# Patient Record
Sex: FEMALE | Race: WHITE | NOT HISPANIC OR LATINO | Employment: UNEMPLOYED | URBAN - METROPOLITAN AREA
[De-identification: names, ages, dates, MRNs, and addresses within clinical notes are randomized per-mention and may not be internally consistent; named-entity substitution may affect disease eponyms.]

---

## 2023-10-02 DIAGNOSIS — E78.5 HYPERLIPIDEMIA, UNSPECIFIED: ICD-10-CM

## 2023-10-02 RX ORDER — ROSUVASTATIN CALCIUM 20 MG/1
TABLET, COATED ORAL
Qty: 90 TABLET | Refills: 0 | Status: SHIPPED | OUTPATIENT
Start: 2023-10-02 | End: 2024-04-22 | Stop reason: SDUPTHER

## 2023-10-02 RX ORDER — ROSUVASTATIN CALCIUM 20 MG/1
20 TABLET, COATED ORAL EVERY OTHER DAY
Qty: 15 TABLET | Refills: 0 | Status: SHIPPED | OUTPATIENT
Start: 2023-10-02 | End: 2023-10-02 | Stop reason: SDUPTHER

## 2023-10-20 ENCOUNTER — TELEMEDICINE (OUTPATIENT)
Dept: PRIMARY CARE | Facility: CLINIC | Age: 58
End: 2023-10-20
Payer: COMMERCIAL

## 2023-10-20 DIAGNOSIS — J32.1 FRONTAL SINUSITIS, UNSPECIFIED CHRONICITY: Primary | ICD-10-CM

## 2023-10-20 DIAGNOSIS — R11.0 NAUSEA: ICD-10-CM

## 2023-10-20 PROBLEM — R76.8 ANA POSITIVE: Status: ACTIVE | Noted: 2023-10-20

## 2023-10-20 PROBLEM — M32.9 LUPUS (SYSTEMIC LUPUS ERYTHEMATOSUS) (MULTI): Status: ACTIVE | Noted: 2023-10-20

## 2023-10-20 PROBLEM — Z79.899 IMMUNOSUPPRESSION DUE TO DRUG THERAPY (MULTI): Status: ACTIVE | Noted: 2023-10-20

## 2023-10-20 PROBLEM — K21.9 CHRONIC GERD: Status: ACTIVE | Noted: 2023-10-20

## 2023-10-20 PROBLEM — E55.9 VITAMIN D DEFICIENCY: Status: ACTIVE | Noted: 2023-10-20

## 2023-10-20 PROBLEM — M33.13 DERMATOMYOSITIS (MULTI): Status: ACTIVE | Noted: 2021-01-08

## 2023-10-20 PROBLEM — I10 BENIGN ESSENTIAL HYPERTENSION: Status: ACTIVE | Noted: 2023-10-20

## 2023-10-20 PROBLEM — M47.812 CERVICAL ARTHRITIS: Status: ACTIVE | Noted: 2023-10-20

## 2023-10-20 PROBLEM — J01.90 ACUTE SINUS INFECTION: Status: ACTIVE | Noted: 2023-10-20

## 2023-10-20 PROBLEM — G50.9 TRIGEMINAL NEUROPATHY: Status: ACTIVE | Noted: 2023-10-20

## 2023-10-20 PROBLEM — R82.90 ABNORMAL URINE ODOR: Status: ACTIVE | Noted: 2023-10-20

## 2023-10-20 PROBLEM — N39.0 UTI (URINARY TRACT INFECTION): Status: ACTIVE | Noted: 2023-10-20

## 2023-10-20 PROBLEM — E03.9 HYPOTHYROIDISM: Status: ACTIVE | Noted: 2020-11-16

## 2023-10-20 PROBLEM — M85.80 OSTEOPENIA: Status: ACTIVE | Noted: 2023-10-20

## 2023-10-20 PROBLEM — D84.821 IMMUNOSUPPRESSION DUE TO DRUG THERAPY (MULTI): Status: ACTIVE | Noted: 2023-10-20

## 2023-10-20 PROBLEM — K21.9 GASTROESOPHAGEAL REFLUX DISEASE: Status: ACTIVE | Noted: 2020-11-16

## 2023-10-20 PROBLEM — R93.1 AGATSTON CORONARY ARTERY CALCIUM SCORE BETWEEN 200 AND 399: Status: ACTIVE | Noted: 2023-10-20

## 2023-10-20 PROBLEM — K31.9 GASTROPATHY: Status: ACTIVE | Noted: 2023-10-20

## 2023-10-20 PROBLEM — R73.9 HYPERGLYCEMIA: Status: ACTIVE | Noted: 2023-10-20

## 2023-10-20 PROBLEM — R07.89 CHEST PRESSURE: Status: ACTIVE | Noted: 2023-10-20

## 2023-10-20 PROBLEM — R05.9 COUGH: Status: ACTIVE | Noted: 2023-10-20

## 2023-10-20 PROBLEM — R20.0 BILATERAL HAND NUMBNESS: Status: ACTIVE | Noted: 2023-10-20

## 2023-10-20 PROBLEM — R53.83 FATIGUE: Status: ACTIVE | Noted: 2023-10-20

## 2023-10-20 PROCEDURE — 99213 OFFICE O/P EST LOW 20 MIN: CPT | Performed by: INTERNAL MEDICINE

## 2023-10-20 RX ORDER — MULTIVIT-MIN/IRON/FOLIC ACID/K 18-600-40
1 CAPSULE ORAL DAILY
COMMUNITY

## 2023-10-20 RX ORDER — ATENOLOL 25 MG/1
25 TABLET ORAL DAILY
COMMUNITY
End: 2023-11-21

## 2023-10-20 RX ORDER — AZATHIOPRINE 50 MG/1
150 TABLET ORAL
COMMUNITY
Start: 2023-01-31

## 2023-10-20 RX ORDER — ONDANSETRON 4 MG/1
4 TABLET, FILM COATED ORAL EVERY 8 HOURS PRN
Qty: 21 TABLET | Refills: 1 | Status: SHIPPED | OUTPATIENT
Start: 2023-10-20

## 2023-10-20 RX ORDER — ACYCLOVIR 400 MG/1
400 TABLET ORAL 3 TIMES DAILY
COMMUNITY
Start: 2023-09-15 | End: 2023-10-20 | Stop reason: WASHOUT

## 2023-10-20 RX ORDER — HYDROXYCHLOROQUINE SULFATE 200 MG/1
400 TABLET, FILM COATED ORAL DAILY
COMMUNITY
Start: 2022-02-10

## 2023-10-20 RX ORDER — AMPICILLIN 500 MG/1
500 CAPSULE ORAL 3 TIMES DAILY
Qty: 21 CAPSULE | Refills: 0 | Status: SHIPPED | OUTPATIENT
Start: 2023-10-20 | End: 2023-10-27

## 2023-10-20 RX ORDER — FLUTICASONE PROPIONATE 50 MCG
1 SPRAY, SUSPENSION (ML) NASAL 2 TIMES DAILY
COMMUNITY

## 2023-10-20 RX ORDER — OMEPRAZOLE 40 MG/1
40 CAPSULE, DELAYED RELEASE ORAL DAILY
COMMUNITY
End: 2024-03-12

## 2023-10-20 RX ORDER — ONDANSETRON 4 MG/1
4 TABLET, FILM COATED ORAL ONCE
Status: DISCONTINUED | OUTPATIENT
Start: 2023-10-20 | End: 2024-01-04

## 2023-10-20 RX ORDER — PREDNISONE 20 MG/1
20 TABLET ORAL 2 TIMES DAILY
COMMUNITY
End: 2023-10-20 | Stop reason: WASHOUT

## 2023-10-20 RX ORDER — IBUPROFEN 600 MG/1
600 TABLET ORAL 3 TIMES DAILY
COMMUNITY

## 2023-10-20 RX ORDER — CHOLECALCIFEROL (VITAMIN D3) 25 MCG
5000 TABLET ORAL DAILY
COMMUNITY

## 2023-10-20 RX ORDER — AMOXICILLIN 875 MG/1
875 TABLET, FILM COATED ORAL EVERY 12 HOURS
COMMUNITY
Start: 2023-09-11 | End: 2023-10-20 | Stop reason: WASHOUT

## 2023-10-20 RX ORDER — LOSARTAN POTASSIUM 25 MG/1
25 TABLET ORAL DAILY
COMMUNITY
End: 2023-11-09

## 2023-10-20 RX ORDER — LEVOTHYROXINE SODIUM 75 UG/1
1 TABLET ORAL DAILY
COMMUNITY
Start: 2018-05-21 | End: 2024-03-18 | Stop reason: SDUPTHER

## 2023-10-20 RX ORDER — PNV NO.95/FERROUS FUM/FOLIC AC 28MG-0.8MG
100 TABLET ORAL DAILY
COMMUNITY

## 2023-10-20 ASSESSMENT — PATIENT HEALTH QUESTIONNAIRE - PHQ9
SUM OF ALL RESPONSES TO PHQ9 QUESTIONS 1 AND 2: 0
2. FEELING DOWN, DEPRESSED OR HOPELESS: NOT AT ALL
1. LITTLE INTEREST OR PLEASURE IN DOING THINGS: NOT AT ALL

## 2023-10-20 ASSESSMENT — ENCOUNTER SYMPTOMS
OCCASIONAL FEELINGS OF UNSTEADINESS: 0
DEPRESSION: 0
LOSS OF SENSATION IN FEET: 0

## 2023-10-20 NOTE — PROGRESS NOTES
Subjective   Patient ID: Lyla Hanson is a 58 y.o. female who presents for flu like symptoms (Patient advised that she has been sick since Sunday. Chest congestion, sinus pressure, sinus congestion. ) and Nausea (Patient would like  an rx for Zofran ).    HPI   58-year-old female with a past medical history of multiple medical problem of Dr. Reed complaining of nausea for last couple of days denies any fever but she feels like her sinus congestion and whenever she stands she feels nauseated probably look like dizziness no chest pain also no sore throat  Review of Systems    Objective   There were no vitals taken for this visit.    Physical Exam    Assessment/Plan        Sinusitis and nausea I put her on ampicillin and gave her Zofran to take as needed advised to give us a call if not getting better or come back and see us in person

## 2023-11-06 DIAGNOSIS — I10 ESSENTIAL (PRIMARY) HYPERTENSION: ICD-10-CM

## 2023-11-07 ENCOUNTER — CLINICAL SUPPORT (OUTPATIENT)
Dept: PRIMARY CARE | Facility: CLINIC | Age: 58
End: 2023-11-07
Payer: COMMERCIAL

## 2023-11-07 DIAGNOSIS — Z23 NEED FOR VACCINATION: ICD-10-CM

## 2023-11-07 PROCEDURE — 90471 IMMUNIZATION ADMIN: CPT | Performed by: FAMILY MEDICINE

## 2023-11-07 PROCEDURE — 90686 IIV4 VACC NO PRSV 0.5 ML IM: CPT | Performed by: FAMILY MEDICINE

## 2023-11-08 NOTE — PROGRESS NOTES
Patient ID: Lyla Hanson is a 58 y.o. female.    ProceduresSubjective   Patient ID: Lyla Hanson is a 58 y.o. female who presents for No chief complaint on file..    HPI     Review of Systems    Objective   There were no vitals taken for this visit.    Physical Exam    Assessment/Plan

## 2023-11-09 RX ORDER — LOSARTAN POTASSIUM 25 MG/1
25 TABLET ORAL DAILY
Qty: 90 TABLET | Refills: 3 | Status: SHIPPED | OUTPATIENT
Start: 2023-11-09 | End: 2023-12-19 | Stop reason: ALTCHOICE

## 2023-11-18 DIAGNOSIS — I10 BENIGN ESSENTIAL HYPERTENSION: Primary | ICD-10-CM

## 2023-11-21 RX ORDER — ATENOLOL 25 MG/1
25 TABLET ORAL DAILY
Qty: 30 TABLET | Refills: 2 | Status: SHIPPED | OUTPATIENT
Start: 2023-11-21 | End: 2024-04-19

## 2023-12-19 ENCOUNTER — OFFICE VISIT (OUTPATIENT)
Dept: CARDIOLOGY | Facility: CLINIC | Age: 58
End: 2023-12-19
Payer: COMMERCIAL

## 2023-12-19 VITALS
WEIGHT: 199 LBS | HEART RATE: 83 BPM | BODY MASS INDEX: 36.62 KG/M2 | HEIGHT: 62 IN | DIASTOLIC BLOOD PRESSURE: 82 MMHG | SYSTOLIC BLOOD PRESSURE: 138 MMHG | TEMPERATURE: 97.5 F

## 2023-12-19 DIAGNOSIS — I10 BENIGN ESSENTIAL HYPERTENSION: Primary | ICD-10-CM

## 2023-12-19 DIAGNOSIS — E78.2 MIXED HYPERLIPIDEMIA: ICD-10-CM

## 2023-12-19 DIAGNOSIS — Z78.9 NEVER SMOKED ANY SUBSTANCE: ICD-10-CM

## 2023-12-19 DIAGNOSIS — R07.89 ATYPICAL CHEST PAIN: ICD-10-CM

## 2023-12-19 DIAGNOSIS — R93.1 AGATSTON CORONARY ARTERY CALCIUM SCORE BETWEEN 200 AND 399: ICD-10-CM

## 2023-12-19 PROCEDURE — 3079F DIAST BP 80-89 MM HG: CPT | Performed by: INTERNAL MEDICINE

## 2023-12-19 PROCEDURE — 99213 OFFICE O/P EST LOW 20 MIN: CPT | Performed by: INTERNAL MEDICINE

## 2023-12-19 PROCEDURE — 3075F SYST BP GE 130 - 139MM HG: CPT | Performed by: INTERNAL MEDICINE

## 2023-12-19 PROCEDURE — 1036F TOBACCO NON-USER: CPT | Performed by: INTERNAL MEDICINE

## 2023-12-19 RX ORDER — REGADENOSON 0.08 MG/ML
0.4 INJECTION, SOLUTION INTRAVENOUS
Status: CANCELLED | OUTPATIENT
Start: 2023-12-19

## 2023-12-19 RX ORDER — OLMESARTAN MEDOXOMIL 20 MG/1
20 TABLET ORAL DAILY
Qty: 90 TABLET | Refills: 3 | Status: SHIPPED | OUTPATIENT
Start: 2023-12-19 | End: 2024-12-18

## 2023-12-19 ASSESSMENT — PATIENT HEALTH QUESTIONNAIRE - PHQ9
2. FEELING DOWN, DEPRESSED OR HOPELESS: NOT AT ALL
1. LITTLE INTEREST OR PLEASURE IN DOING THINGS: NOT AT ALL
SUM OF ALL RESPONSES TO PHQ9 QUESTIONS 1 AND 2: 0

## 2023-12-19 ASSESSMENT — ENCOUNTER SYMPTOMS
PALPITATIONS: 1
ABDOMINAL DISTENTION: 1
PSYCHIATRIC NEGATIVE: 1
ABDOMINAL PAIN: 1
HEMATURIA: 0
RESPIRATORY NEGATIVE: 1
LIGHT-HEADEDNESS: 1
ENDOCRINE NEGATIVE: 1
FREQUENCY: 1
EYES NEGATIVE: 1
ARTHRALGIAS: 1
CONSTITUTIONAL NEGATIVE: 1

## 2023-12-19 NOTE — PROGRESS NOTES
Patient:  Lyla Hanson  YOB: 1965  MRN: [id number]       Chief Complaint/Active Symptoms:       Lyla Hanson is a 58 y.o. female who returns today for cardiac follow-up.    Here for follow-up of HTN. Has had some labile blood pressure readings. Had a lot of problems when she was out of town and had shingles. Her immuran was on hold and her BP dropped. Has periods where her HR is both high and low. Only episode of near syncope was when she was sick and purportedly dehydrated her HR was 140 and she felt near syncopal. Resolved and has not recurred.      Has had no consistent chest pain. Had an episode of transient squeezing central chest pain lasting 5-10 seconds without other associated symptoms. No recurrence.     No shortness of breath, orthopnea or PND.       Review of Systems   Constitutional: Negative.    HENT: Negative.     Eyes: Negative.    Respiratory: Negative.     Cardiovascular:  Positive for chest pain and palpitations.   Gastrointestinal:  Positive for abdominal distention and abdominal pain (GERD).   Endocrine: Negative.    Genitourinary:  Positive for frequency (foaming urine - resolved). Negative for hematuria.   Musculoskeletal:  Positive for arthralgias.   Neurological:  Positive for light-headedness. Negative for syncope.   Psychiatric/Behavioral: Negative.     All other systems reviewed and are negative.      Objective:     Vitals:    12/19/23 0918   BP: 138/82   Pulse: 83   Temp: 36.4 °C (97.5 °F)       Vitals:    12/19/23 0918   Weight: 90.3 kg (199 lb)       Allergies:     Allergies   Allergen Reactions    Bactrim [Sulfamethoxazole-Trimethoprim] Hives    Pseudoephedrine Hcl Unknown    Ciprofloxacin Palpitations and Other     Other reaction(s): Irregular Heartbeat          Medications:     Current Outpatient Medications   Medication Instructions    ascorbic acid, vitamin C, 500 mg capsule 1 capsule, oral, Daily    atenolol (TENORMIN) 25 mg, oral, Daily    azaTHIOprine  (IMURAN) 150 mg, oral, Daily RT    cholecalciferol (VITAMIN D-3) 1,000 Units, oral, Daily    cyanocobalamin (VITAMIN B-12) 100 mcg, oral, Daily    fluticasone (Flonase) 50 mcg/actuation nasal spray 1 spray, nasal, 2 times daily    hydroxychloroquine (PLAQUENIL) 400 mg, oral, Daily    ibuprofen 600 mg, oral, 3 times daily    levothyroxine (Synthroid, Levoxyl) 75 mcg tablet 1 tablet, oral, Daily    olmesartan (BENICAR) 20 mg, oral, Daily    omeprazole (PRILOSEC) 40 mg, oral, Daily    ondansetron (ZOFRAN) 4 mg, oral, Every 8 hours PRN    rosuvastatin (Crestor) 20 mg tablet AT BEDTIME       Physical Examination:   GENERAL:  Well developed, well nourished, in no acute distress.  HEENT: NC AT, EOMI with anicteric sclera  NECK:  Supple, no JVD, no bruit.  CHEST:  Symmetric and nontender.  LUNGS:  Clear to auscultation bilaterally, normal respiratory effort.  HEART:  PMI is nondisplaced. RRR with normal S1 and S2, no S3, no mumur or rub. No carotid or abdominal bruits  ABDOMEN: Soft, NT, ND without palpable organomegaly or bruits  EXTREMITIES:  Warm with good color, no clubbing or cyanosis.  There is no edema noted.  PERIPHERAL VASCULAR:  Pulses present and equally palpable; 2+ throughout.  MUSCULOSKELETAL: Mild arthritic changes  NEURO/PSYCH:  Alert and oriented times three with approppriate behavior and responses. Nonfocal motor examination with normal gait and ambulation  Lymph: No significant palpable lymphadenopathy  Skin: no rash or lesions on exposed skin or reported.    Lab:     CBC:   Lab Results   Component Value Date    WBC 4.3 (L) 01/12/2023    RBC 4.65 01/12/2023    HGB 13.1 01/12/2023    HCT 41.3 01/12/2023     01/12/2023        CMP:    Lab Results   Component Value Date     01/12/2023    K 4.3 01/12/2023     01/12/2023    CO2 31 01/12/2023    BUN 9 01/12/2023    CREATININE 0.87 01/12/2023    GLUCOSE 86 01/12/2023    CALCIUM 9.4 01/12/2023       Magnesium:    No results found for:  "\"MG\"    Lipid Profile:    Lab Results   Component Value Date    TRIG 56 01/12/2023    HDL 52.0 01/12/2023       TSH:    Lab Results   Component Value Date    TSH 1.82 01/12/2023       BNP:   No results found for: \"BNP\"     PT/INR:    Lab Results   Component Value Date    PROTIME 12.5 04/08/2022    INR 1.1 04/08/2022       HgBA1c:    Lab Results   Component Value Date    HGBA1C 5.8 (A) 02/26/2022       BMP:  Lab Results   Component Value Date     01/12/2023     04/08/2022     12/08/2021    K 4.3 01/12/2023    K 4.7 08/16/2022    K 5.4 (H) 08/08/2022     01/12/2023     04/08/2022     12/08/2021    CO2 31 01/12/2023    CO2 26 04/08/2022    CO2 32 12/08/2021    BUN 9 01/12/2023    BUN 8 04/08/2022    BUN 9 12/08/2021    CREATININE 0.87 01/12/2023    CREATININE 0.77 04/08/2022    CREATININE 0.86 12/08/2021       Cardiac Enzymes:    No results found for: \"TROPHS\"    Hepatic Function Panel:    Lab Results   Component Value Date    ALKPHOS 79 01/12/2023    ALT 16 01/12/2023    AST 27 01/12/2023    PROT 6.9 01/12/2023    BILITOT 0.6 01/12/2023         Diagnostic Studies:     No echocardiogram results found for the past 12 months    No nuclear medicine results found for the past 12 months    No valid procedures specified.    EKG:   No results found for: \"EKG\"    Radiology:     Nuclear Stress Test    (Results Pending)         ASSESSMENT     Problem List Items Addressed This Visit       Agatston coronary artery calcium score between 200 and 399    Relevant Orders    Nuclear Stress Test    Benign essential hypertension - Primary    Relevant Medications    olmesartan (Benicar) 20 mg tablet    Hyperlipidemia    Never smoked any substance    Atypical chest pain    Relevant Orders    Nuclear Stress Test       PLAN     1.  Atypical chest pain.  The patient has some ongoing intermittent episodes of different types of chest discomfort.  As we discussed at her last appointment we will do a risk " stratification with a stress test.  If that is normal or low risk then we will continue to follow her clinically with risk factor modification.  2.  Benign essential hypertension with reactive hypertension.  Patient has some labile blood pressure readings.  Will change her to to olmesartan which is a better 24-hour blood pressure medication.  Monitor blood pressure readings.  Some of her changes are could be due to changing doses of her Imuran.  We have warned her that if her Imuran is held for any reason she should watch and should probably cut her dose of blood pressure medications in half.  3.  Hyperlipidemia.  Reviewed her labs LDL cholesterol was at goal.  4.  Tobacco and weight status.  Patient is a lifelong non-smoker but is moderately obese.  We have encouraged heart healthy weight reduction diet.    I will see the patient in follow-up in 6 months if her stress test is normal.  Any abnormalities we will bring her back for an earlier appointment for reassessment

## 2024-01-03 ENCOUNTER — ANCILLARY PROCEDURE (OUTPATIENT)
Dept: RADIOLOGY | Facility: CLINIC | Age: 59
End: 2024-01-03
Payer: COMMERCIAL

## 2024-01-03 ENCOUNTER — HOSPITAL ENCOUNTER (OUTPATIENT)
Dept: CARDIOLOGY | Facility: CLINIC | Age: 59
Discharge: HOME | End: 2024-01-03
Payer: COMMERCIAL

## 2024-01-03 DIAGNOSIS — R07.89 ATYPICAL CHEST PAIN: ICD-10-CM

## 2024-01-03 DIAGNOSIS — R93.1 AGATSTON CORONARY ARTERY CALCIUM SCORE BETWEEN 200 AND 399: ICD-10-CM

## 2024-01-03 PROCEDURE — A9502 TC99M TETROFOSMIN: HCPCS | Performed by: INTERNAL MEDICINE

## 2024-01-03 PROCEDURE — 3430000001 HC RX 343 DIAGNOSTIC RADIOPHARMACEUTICALS: Performed by: INTERNAL MEDICINE

## 2024-01-03 PROCEDURE — 78452 HT MUSCLE IMAGE SPECT MULT: CPT | Performed by: INTERNAL MEDICINE

## 2024-01-03 PROCEDURE — 93017 CV STRESS TEST TRACING ONLY: CPT

## 2024-01-03 PROCEDURE — 2500000004 HC RX 250 GENERAL PHARMACY W/ HCPCS (ALT 636 FOR OP/ED): Performed by: INTERNAL MEDICINE

## 2024-01-03 PROCEDURE — 93018 CV STRESS TEST I&R ONLY: CPT | Performed by: INTERNAL MEDICINE

## 2024-01-03 PROCEDURE — 93016 CV STRESS TEST SUPVJ ONLY: CPT | Performed by: INTERNAL MEDICINE

## 2024-01-03 PROCEDURE — 78452 HT MUSCLE IMAGE SPECT MULT: CPT

## 2024-01-03 RX ORDER — REGADENOSON 0.08 MG/ML
0.4 INJECTION, SOLUTION INTRAVENOUS ONCE
Status: COMPLETED | OUTPATIENT
Start: 2024-01-03 | End: 2024-01-03

## 2024-01-03 RX ORDER — AMINOPHYLLINE 25 MG/ML
50 INJECTION, SOLUTION INTRAVENOUS ONCE
Status: COMPLETED | OUTPATIENT
Start: 2024-01-03 | End: 2024-01-03

## 2024-01-03 RX ADMIN — TETROFOSMIN 10.9 MILLICURIE: 0.23 INJECTION, POWDER, LYOPHILIZED, FOR SOLUTION INTRAVENOUS at 08:12

## 2024-01-03 RX ADMIN — TETROFOSMIN 35.7 MILLICURIE: 0.23 INJECTION, POWDER, LYOPHILIZED, FOR SOLUTION INTRAVENOUS at 09:19

## 2024-01-03 RX ADMIN — REGADENOSON 0.4 MG: 0.08 INJECTION, SOLUTION INTRAVENOUS at 09:34

## 2024-01-03 RX ADMIN — AMINOPHYLLINE 50 MG: 25 INJECTION, SOLUTION INTRAVENOUS at 09:39

## 2024-01-04 ENCOUNTER — APPOINTMENT (OUTPATIENT)
Dept: CARDIOLOGY | Facility: CLINIC | Age: 59
End: 2024-01-04
Payer: COMMERCIAL

## 2024-01-04 ENCOUNTER — OFFICE VISIT (OUTPATIENT)
Dept: CARDIOLOGY | Facility: CLINIC | Age: 59
End: 2024-01-04
Payer: COMMERCIAL

## 2024-01-04 VITALS
DIASTOLIC BLOOD PRESSURE: 78 MMHG | SYSTOLIC BLOOD PRESSURE: 124 MMHG | HEIGHT: 62 IN | WEIGHT: 195 LBS | BODY MASS INDEX: 35.88 KG/M2 | HEART RATE: 64 BPM | TEMPERATURE: 98.4 F

## 2024-01-04 DIAGNOSIS — Z78.9 NEVER SMOKED ANY SUBSTANCE: ICD-10-CM

## 2024-01-04 DIAGNOSIS — R53.82 CHRONIC FATIGUE: ICD-10-CM

## 2024-01-04 DIAGNOSIS — I10 BENIGN ESSENTIAL HYPERTENSION: Primary | ICD-10-CM

## 2024-01-04 DIAGNOSIS — R07.89 ATYPICAL CHEST PAIN: ICD-10-CM

## 2024-01-04 PROCEDURE — 3074F SYST BP LT 130 MM HG: CPT | Performed by: INTERNAL MEDICINE

## 2024-01-04 PROCEDURE — 99213 OFFICE O/P EST LOW 20 MIN: CPT | Performed by: INTERNAL MEDICINE

## 2024-01-04 PROCEDURE — 1036F TOBACCO NON-USER: CPT | Performed by: INTERNAL MEDICINE

## 2024-01-04 PROCEDURE — 3078F DIAST BP <80 MM HG: CPT | Performed by: INTERNAL MEDICINE

## 2024-01-04 RX ORDER — CLONIDINE HYDROCHLORIDE 0.1 MG/1
0.1 TABLET ORAL EVERY 6 HOURS PRN
Qty: 60 TABLET | Refills: 1 | Status: SHIPPED | OUTPATIENT
Start: 2024-01-04 | End: 2024-04-03

## 2024-01-04 NOTE — PROGRESS NOTES
"  Patient:  Lyla Hanson  YOB: 1965  MRN: [id number]       Chief Complaint/Active Symptoms:       Lyla Hanson is a 58 y.o. female who returns today for cardiac follow-up.    Here to discuss BP as she felt poorly and her chest was \"bothering her\" and her BP was average in the 160's/90's. BP yesterday was borderline and is normal today. She knows when her BP is high as she always feels very poorly. She has noted that about 1 week every 3 months she tends to have this pattern and is frustrated. Was told the result of her stress test by her [facility] Rheumatologist yesterday and doesn't know if she should continue to be worried. He feels that this could be her dermatomyositis and is undergoing testing for same.     Chest discomfort / heaviness occurs at rest, no clear exertional component without other associated symptoms such as SOB, diaphoresis, N/V. Can last for minutes to an hour. None today.      Review of Systems  No other complaints  Objective:     Vitals:    01/04/24 1101   BP: 124/78   Pulse: 64   Temp: 36.9 °C (98.4 °F)       Vitals:    01/04/24 1101   Weight: 88.5 kg (195 lb)       Allergies:     Allergies   Allergen Reactions    Bactrim [Sulfamethoxazole-Trimethoprim] Hives    Pseudoephedrine Hcl Unknown    Ciprofloxacin Palpitations and Other     Other reaction(s): Irregular Heartbeat          Medications:     Current Outpatient Medications   Medication Instructions    ascorbic acid, vitamin C, 500 mg capsule 1 capsule, oral, Daily    atenolol (TENORMIN) 25 mg, oral, Daily    azaTHIOprine (IMURAN) 150 mg, oral, Daily RT    cholecalciferol (VITAMIN D-3) 1,000 Units, oral, Daily    cloNIDine (CATAPRES) 0.1 mg, oral, Every 6 hours PRN    cyanocobalamin (VITAMIN B-12) 100 mcg, oral, Daily    fluticasone (Flonase) 50 mcg/actuation nasal spray 1 spray, nasal, 2 times daily    hydroxychloroquine (PLAQUENIL) 400 mg, oral, Daily    ibuprofen 600 mg, oral, 3 times daily    levothyroxine (Synthroid, " "Levoxyl) 75 mcg tablet 1 tablet, oral, Daily    olmesartan (BENICAR) 20 mg, oral, Daily    omeprazole (PRILOSEC) 40 mg, oral, Daily    ondansetron (ZOFRAN) 4 mg, oral, Every 8 hours PRN    rosuvastatin (Crestor) 20 mg tablet AT BEDTIME       Physical Examination:   GENERAL:  Well developed, well nourished, in no acute distress.  HEENT: NC AT, EOMI with anicteric sclera  NECK:  no JVD  CHEST:  Symmetric and nontender.  LUNGS:  Clear to auscultation bilaterally, normal respiratory effort.  HEART:  PMI is non-palpable. RRR with normal S1 and S2, no S3, no mumur or rub. No carotid or abdominal bruits  EXTREMITIES:  Warm with good color, no clubbing or cyanosis.  There is no edema noted.  PERIPHERAL VASCULAR:  Pulses present and equally palpable; 2+ throughout.  MUSCULOSKELETAL: Mild arthritic changes  NEURO/PSYCH:  Alert and oriented times three with approppriate behavior and responses. Nonfocal motor examination with normal gait and ambulation  Skin: no rash or lesions on exposed skin or reported.    Lab:     CBC:   Lab Results   Component Value Date    WBC 4.3 (L) 01/12/2023    RBC 4.65 01/12/2023    HGB 13.1 01/12/2023    HCT 41.3 01/12/2023     01/12/2023        CMP:    Lab Results   Component Value Date     01/12/2023    K 4.3 01/12/2023     01/12/2023    CO2 31 01/12/2023    BUN 9 01/12/2023    CREATININE 0.87 01/12/2023    GLUCOSE 86 01/12/2023    CALCIUM 9.4 01/12/2023       Magnesium:    No results found for: \"MG\"    Lipid Profile:    Lab Results   Component Value Date    TRIG 56 01/12/2023    HDL 52.0 01/12/2023       TSH:    Lab Results   Component Value Date    TSH 1.82 01/12/2023       BNP:   No results found for: \"BNP\"     PT/INR:    Lab Results   Component Value Date    PROTIME 12.5 04/08/2022    INR 1.1 04/08/2022       HgBA1c:    Lab Results   Component Value Date    HGBA1C 5.8 (A) 02/26/2022       BMP:  Lab Results   Component Value Date     01/12/2023     04/08/2022    " " 12/08/2021    K 4.3 01/12/2023    K 4.7 08/16/2022    K 5.4 (H) 08/08/2022     01/12/2023     04/08/2022     12/08/2021    CO2 31 01/12/2023    CO2 26 04/08/2022    CO2 32 12/08/2021    BUN 9 01/12/2023    BUN 8 04/08/2022    BUN 9 12/08/2021    CREATININE 0.87 01/12/2023    CREATININE 0.77 04/08/2022    CREATININE 0.86 12/08/2021       Cardiac Enzymes:    No results found for: \"TROPHS\"    Hepatic Function Panel:    Lab Results   Component Value Date    ALKPHOS 79 01/12/2023    ALT 16 01/12/2023    AST 27 01/12/2023    PROT 6.9 01/12/2023    BILITOT 0.6 01/12/2023         Diagnostic Studies:     No echocardiogram results found for the past 12 months    No nuclear medicine results found for the past 12 months  Nuclear stress test yesterday with normal myocardial perfusion and EF, normal wall motion.   No valid procedures specified.    EKG:   No results found for: \"EKG\"    Radiology:     No orders to display         ASSESSMENT     Problem List Items Addressed This Visit       Benign essential hypertension - Primary    Relevant Medications    cloNIDine (Catapres) 0.1 mg tablet    Fatigue    Never smoked any substance    Atypical chest pain       PLAN     Atypical chest pain. Not highly suggestive of angina. Given normal stress test yesterday would have her work with her rheumatologist for evaluation. If that work-up negative would give her a trial of more specific anti-anginal medications.   HTN - labile. Rather than further increasing her medications will give her clonidine for prn use as she feels so poorly when her BP is high.   Fatigue - multifactorial. Feels stressed at work and is taking a 3 month sabbatical.   Tobacco and weight status - a nonsmoker.     Unless she continues to have problems will see her as scheduled in June. She knows to contact me if she is still having symptoms and her rheumatology work-up is negative.               "

## 2024-01-05 ENCOUNTER — APPOINTMENT (OUTPATIENT)
Dept: RADIOLOGY | Facility: CLINIC | Age: 59
End: 2024-01-05
Payer: COMMERCIAL

## 2024-01-05 ENCOUNTER — APPOINTMENT (OUTPATIENT)
Dept: CARDIOLOGY | Facility: CLINIC | Age: 59
End: 2024-01-05
Payer: COMMERCIAL

## 2024-02-01 ENCOUNTER — TELEPHONE (OUTPATIENT)
Dept: PRIMARY CARE | Facility: CLINIC | Age: 59
End: 2024-02-01
Payer: COMMERCIAL

## 2024-02-01 DIAGNOSIS — R92.8 ABNORMAL MAMMOGRAM: ICD-10-CM

## 2024-02-01 NOTE — TELEPHONE ENCOUNTER
Patient called the office and advised that she had a mammogram in July 2023 and it was recommended that she have a repeat left breast mammogram done in 6 months. Patient is asking for the order to be put in her chart and faxed to [facility] at [phone]    Order pended

## 2024-02-02 DIAGNOSIS — R92.8 ABNORMALITY OF LEFT BREAST ON SCREENING MAMMOGRAM: Primary | ICD-10-CM

## 2024-02-14 NOTE — RESULT ENCOUNTER NOTE
The radiologist stated there was no evidence of malignancy in either breast and recommended a one year follow up.  If she wants to ask her insurance company if they will cover another mammogram in 6 months, I can order this, but it looks like the radiologist believes she is fine to wait one year.

## 2024-02-29 ENCOUNTER — OFFICE VISIT (OUTPATIENT)
Dept: PRIMARY CARE | Facility: CLINIC | Age: 59
End: 2024-02-29
Payer: COMMERCIAL

## 2024-02-29 VITALS
WEIGHT: 200.2 LBS | BODY MASS INDEX: 36.84 KG/M2 | OXYGEN SATURATION: 99 % | HEART RATE: 72 BPM | DIASTOLIC BLOOD PRESSURE: 92 MMHG | TEMPERATURE: 98.2 F | HEIGHT: 62 IN | SYSTOLIC BLOOD PRESSURE: 150 MMHG

## 2024-02-29 DIAGNOSIS — R73.9 HYPERGLYCEMIA: ICD-10-CM

## 2024-02-29 DIAGNOSIS — E78.2 MIXED HYPERLIPIDEMIA: ICD-10-CM

## 2024-02-29 DIAGNOSIS — M54.12 RIGHT CERVICAL RADICULOPATHY: Primary | ICD-10-CM

## 2024-02-29 DIAGNOSIS — E55.9 VITAMIN D DEFICIENCY: ICD-10-CM

## 2024-02-29 DIAGNOSIS — E03.9 HYPOTHYROIDISM, UNSPECIFIED TYPE: ICD-10-CM

## 2024-02-29 DIAGNOSIS — S16.1XXA NECK MUSCLE STRAIN, INITIAL ENCOUNTER: ICD-10-CM

## 2024-02-29 PROCEDURE — 3080F DIAST BP >= 90 MM HG: CPT | Performed by: STUDENT IN AN ORGANIZED HEALTH CARE EDUCATION/TRAINING PROGRAM

## 2024-02-29 PROCEDURE — 1036F TOBACCO NON-USER: CPT | Performed by: STUDENT IN AN ORGANIZED HEALTH CARE EDUCATION/TRAINING PROGRAM

## 2024-02-29 PROCEDURE — 99214 OFFICE O/P EST MOD 30 MIN: CPT | Performed by: STUDENT IN AN ORGANIZED HEALTH CARE EDUCATION/TRAINING PROGRAM

## 2024-02-29 PROCEDURE — 3077F SYST BP >= 140 MM HG: CPT | Performed by: STUDENT IN AN ORGANIZED HEALTH CARE EDUCATION/TRAINING PROGRAM

## 2024-02-29 RX ORDER — METHYLPREDNISOLONE 4 MG/1
TABLET ORAL
Qty: 21 TABLET | Refills: 0 | Status: SHIPPED | OUTPATIENT
Start: 2024-02-29 | End: 2024-03-07

## 2024-02-29 ASSESSMENT — PATIENT HEALTH QUESTIONNAIRE - PHQ9
2. FEELING DOWN, DEPRESSED OR HOPELESS: NOT AT ALL
SUM OF ALL RESPONSES TO PHQ9 QUESTIONS 1 AND 2: 0
1. LITTLE INTEREST OR PLEASURE IN DOING THINGS: NOT AT ALL

## 2024-02-29 ASSESSMENT — ENCOUNTER SYMPTOMS: PAIN: 1

## 2024-02-29 NOTE — PROGRESS NOTES
"Subjective   Patient ID: Lyla Hanson is a 58 y.o. female who presents for Pain (Patient is in office today for pinching a nerve in her neck and her R arm and hand has been bothering her. Patient advises that she has been using CBD cream, muscle creams and heating pads and its not helping. Patient advises that its getting worse. ).    Still neck since Monday Tuesday she noticed that it radiates down her arm  Mild N/T  No history of neck issues, no prior surgery  Took muscle relaxor yesterday and today, did not help  No falls or injuries  Reviewed xray of c spine from 6/20/22, which showed mild anterolisthesis of C5 on C6 with mild neural foraminal narrowing at the same level  Denies fever  Does also have headache, across the front    Plan  Labs  Medrol pack  Xrays  Follow up if not better in a week or 2    Pain         Review of Systems    Objective   BP (!) 150/92 (BP Location: Left arm, Patient Position: Sitting, BP Cuff Size: Large adult)   Pulse 72   Temp 36.8 °C (98.2 °F) (Temporal)   Ht 1.575 m (5' 2\")   Wt 90.8 kg (200 lb 3.2 oz)   SpO2 99% Comment: RA  BMI 36.62 kg/m²     Physical Exam  Vitals reviewed.   Constitutional:       General: She is not in acute distress.     Appearance: Normal appearance. She is not ill-appearing or toxic-appearing.   HENT:      Head: Atraumatic.      Mouth/Throat:      Mouth: Mucous membranes are moist.      Pharynx: Oropharynx is clear.   Eyes:      Extraocular Movements: Extraocular movements intact.      Conjunctiva/sclera: Conjunctivae normal.      Pupils: Pupils are equal, round, and reactive to light.   Cardiovascular:      Rate and Rhythm: Normal rate and regular rhythm.      Pulses: Normal pulses.      Heart sounds: Normal heart sounds. No murmur heard.  Pulmonary:      Effort: Pulmonary effort is normal. No respiratory distress.      Breath sounds: Normal breath sounds.   Abdominal:      General: Abdomen is flat.      Palpations: Abdomen is soft.      " Tenderness: There is no abdominal tenderness.   Musculoskeletal:      Right lower leg: No edema.      Left lower leg: No edema.   Skin:     General: Skin is warm and dry.      Capillary Refill: Capillary refill takes less than 2 seconds.      Findings: No rash.   Neurological:      General: No focal deficit present.      Mental Status: She is alert.      Cranial Nerves: No cranial nerve deficit.      Gait: Gait normal.   Psychiatric:         Mood and Affect: Mood normal.         Behavior: Behavior normal.         Assessment/Plan   Problem List Items Addressed This Visit             ICD-10-CM    Hyperglycemia R73.9    Relevant Orders    Basic metabolic panel    Hyperlipidemia E78.5    Relevant Orders    Lipid panel    Hypothyroidism E03.9    Relevant Orders    Tsh With Reflex To Free T4 If Abnormal    Vitamin D deficiency E55.9    Relevant Orders    Vitamin D 25-Hydroxy,Total (for eval of Vitamin D levels)    Magnesium     Other Visit Diagnoses         Codes    Right cervical radiculopathy    -  Primary M54.12    Relevant Medications    methylPREDNISolone (Medrol Dospak) 4 mg tablets    Other Relevant Orders    XR cervical spine 2-3 views    Neck muscle strain, initial encounter     S16.1XXA    Relevant Medications    methylPREDNISolone (Medrol Dospak) 4 mg tablets

## 2024-03-02 ENCOUNTER — LAB (OUTPATIENT)
Dept: LAB | Facility: LAB | Age: 59
End: 2024-03-02
Payer: COMMERCIAL

## 2024-03-02 ENCOUNTER — HOSPITAL ENCOUNTER (OUTPATIENT)
Dept: RADIOLOGY | Facility: CLINIC | Age: 59
Discharge: HOME | End: 2024-03-02
Payer: COMMERCIAL

## 2024-03-02 DIAGNOSIS — R73.9 HYPERGLYCEMIA: ICD-10-CM

## 2024-03-02 DIAGNOSIS — M54.12 RIGHT CERVICAL RADICULOPATHY: ICD-10-CM

## 2024-03-02 DIAGNOSIS — E03.9 HYPOTHYROIDISM, UNSPECIFIED TYPE: ICD-10-CM

## 2024-03-02 DIAGNOSIS — E55.9 VITAMIN D DEFICIENCY: ICD-10-CM

## 2024-03-02 DIAGNOSIS — E78.2 MIXED HYPERLIPIDEMIA: ICD-10-CM

## 2024-03-02 LAB
25(OH)D3 SERPL-MCNC: 40 NG/ML (ref 30–100)
ANION GAP SERPL CALC-SCNC: 14 MMOL/L (ref 10–20)
BUN SERPL-MCNC: 12 MG/DL (ref 6–23)
CALCIUM SERPL-MCNC: 9.6 MG/DL (ref 8.6–10.3)
CHLORIDE SERPL-SCNC: 101 MMOL/L (ref 98–107)
CHOLEST SERPL-MCNC: 155 MG/DL (ref 0–199)
CHOLESTEROL/HDL RATIO: 2.2
CO2 SERPL-SCNC: 25 MMOL/L (ref 21–32)
CREAT SERPL-MCNC: 0.84 MG/DL (ref 0.5–1.05)
EGFRCR SERPLBLD CKD-EPI 2021: 81 ML/MIN/1.73M*2
GLUCOSE SERPL-MCNC: 90 MG/DL (ref 74–99)
HDLC SERPL-MCNC: 71.4 MG/DL
LDLC SERPL CALC-MCNC: 71 MG/DL
MAGNESIUM SERPL-MCNC: 2.06 MG/DL (ref 1.6–2.4)
NON HDL CHOLESTEROL: 84 MG/DL (ref 0–149)
POTASSIUM SERPL-SCNC: 4.7 MMOL/L (ref 3.5–5.3)
SODIUM SERPL-SCNC: 135 MMOL/L (ref 136–145)
TRIGL SERPL-MCNC: 62 MG/DL (ref 0–149)
TSH SERPL-ACNC: 1.8 MIU/L (ref 0.44–3.98)
VLDL: 12 MG/DL (ref 0–40)

## 2024-03-02 PROCEDURE — 36415 COLL VENOUS BLD VENIPUNCTURE: CPT

## 2024-03-02 PROCEDURE — 83735 ASSAY OF MAGNESIUM: CPT

## 2024-03-02 PROCEDURE — 84443 ASSAY THYROID STIM HORMONE: CPT

## 2024-03-02 PROCEDURE — 82306 VITAMIN D 25 HYDROXY: CPT

## 2024-03-02 PROCEDURE — 72040 X-RAY EXAM NECK SPINE 2-3 VW: CPT

## 2024-03-02 PROCEDURE — 80061 LIPID PANEL: CPT

## 2024-03-02 PROCEDURE — 72040 X-RAY EXAM NECK SPINE 2-3 VW: CPT | Performed by: RADIOLOGY

## 2024-03-02 PROCEDURE — 80048 BASIC METABOLIC PNL TOTAL CA: CPT

## 2024-03-12 DIAGNOSIS — K21.9 GASTRO-ESOPHAGEAL REFLUX DISEASE WITHOUT ESOPHAGITIS: ICD-10-CM

## 2024-03-12 RX ORDER — OMEPRAZOLE 40 MG/1
40 CAPSULE, DELAYED RELEASE ORAL DAILY
Qty: 90 CAPSULE | Refills: 1 | Status: SHIPPED | OUTPATIENT
Start: 2024-03-12

## 2024-03-18 DIAGNOSIS — E03.9 ACQUIRED HYPOTHYROIDISM: Primary | ICD-10-CM

## 2024-03-18 RX ORDER — LEVOTHYROXINE SODIUM 75 UG/1
75 TABLET ORAL DAILY
Qty: 90 TABLET | Refills: 3 | Status: SHIPPED | OUTPATIENT
Start: 2024-03-18

## 2024-04-19 DIAGNOSIS — I10 BENIGN ESSENTIAL HYPERTENSION: ICD-10-CM

## 2024-04-19 RX ORDER — ATENOLOL 25 MG/1
25 TABLET ORAL DAILY
Qty: 90 TABLET | Refills: 1 | Status: SHIPPED | OUTPATIENT
Start: 2024-04-19

## 2024-04-21 DIAGNOSIS — E78.5 HYPERLIPIDEMIA, UNSPECIFIED: ICD-10-CM

## 2024-04-22 DIAGNOSIS — Z79.899 HIGH RISK MEDICATION USE: ICD-10-CM

## 2024-04-22 DIAGNOSIS — E78.2 MIXED HYPERLIPIDEMIA: ICD-10-CM

## 2024-04-22 DIAGNOSIS — E78.5 HYPERLIPIDEMIA, UNSPECIFIED: ICD-10-CM

## 2024-04-22 DIAGNOSIS — E53.8 B12 DEFICIENCY: ICD-10-CM

## 2024-04-22 DIAGNOSIS — E55.9 VITAMIN D DEFICIENCY: ICD-10-CM

## 2024-04-22 DIAGNOSIS — Z00.00 HEALTHCARE MAINTENANCE: Primary | ICD-10-CM

## 2024-04-22 RX ORDER — ROSUVASTATIN CALCIUM 20 MG/1
TABLET, COATED ORAL
Qty: 90 TABLET | Refills: 0 | OUTPATIENT
Start: 2024-04-22

## 2024-04-22 RX ORDER — ROSUVASTATIN CALCIUM 20 MG/1
TABLET, COATED ORAL
Qty: 30 TABLET | Refills: 0 | Status: SHIPPED | OUTPATIENT
Start: 2024-04-22 | End: 2024-05-28 | Stop reason: SDUPTHER

## 2024-05-28 DIAGNOSIS — K21.9 GASTRO-ESOPHAGEAL REFLUX DISEASE WITHOUT ESOPHAGITIS: ICD-10-CM

## 2024-05-28 DIAGNOSIS — E78.5 HYPERLIPIDEMIA, UNSPECIFIED: ICD-10-CM

## 2024-05-28 RX ORDER — OMEPRAZOLE 40 MG/1
40 CAPSULE, DELAYED RELEASE ORAL DAILY
Qty: 90 CAPSULE | Refills: 1 | Status: CANCELLED | OUTPATIENT
Start: 2024-05-28

## 2024-05-28 RX ORDER — ROSUVASTATIN CALCIUM 20 MG/1
TABLET, COATED ORAL
Qty: 90 TABLET | Refills: 1 | Status: SHIPPED | OUTPATIENT
Start: 2024-05-28

## 2024-06-25 ENCOUNTER — APPOINTMENT (OUTPATIENT)
Dept: CARDIOLOGY | Facility: CLINIC | Age: 59
End: 2024-06-25

## 2024-06-28 ENCOUNTER — APPOINTMENT (OUTPATIENT)
Dept: PRIMARY CARE | Facility: CLINIC | Age: 59
End: 2024-06-28

## 2024-07-02 ENCOUNTER — APPOINTMENT (OUTPATIENT)
Dept: CARDIOLOGY | Facility: CLINIC | Age: 59
End: 2024-07-02

## 2024-08-06 ENCOUNTER — APPOINTMENT (OUTPATIENT)
Dept: PRIMARY CARE | Facility: CLINIC | Age: 59
End: 2024-08-06

## 2024-08-06 DIAGNOSIS — Z78.0 MENOPAUSE: Primary | ICD-10-CM

## 2024-08-06 DIAGNOSIS — N85.00 ENDOMETRIAL HYPERPLASIA: ICD-10-CM

## 2024-08-06 DIAGNOSIS — K21.9 GASTRO-ESOPHAGEAL REFLUX DISEASE WITHOUT ESOPHAGITIS: ICD-10-CM

## 2024-08-06 DIAGNOSIS — Z76.0 ENCOUNTER FOR MEDICATION REFILL: ICD-10-CM

## 2024-08-06 DIAGNOSIS — E03.9 ACQUIRED HYPOTHYROIDISM: ICD-10-CM

## 2024-08-06 DIAGNOSIS — Z12.31 BREAST CANCER SCREENING BY MAMMOGRAM: ICD-10-CM

## 2024-08-06 DIAGNOSIS — I10 BENIGN ESSENTIAL HYPERTENSION: ICD-10-CM

## 2024-08-06 DIAGNOSIS — E78.5 HYPERLIPIDEMIA, UNSPECIFIED: ICD-10-CM

## 2024-08-06 PROCEDURE — 1036F TOBACCO NON-USER: CPT | Performed by: FAMILY MEDICINE

## 2024-08-06 PROCEDURE — 99443 PR PHYS/QHP TELEPHONE EVALUATION 21-30 MIN: CPT | Performed by: FAMILY MEDICINE

## 2024-08-06 RX ORDER — OMEPRAZOLE 40 MG/1
40 CAPSULE, DELAYED RELEASE ORAL DAILY
Qty: 90 CAPSULE | Refills: 1 | Status: SHIPPED | OUTPATIENT
Start: 2024-08-06

## 2024-08-06 RX ORDER — ATENOLOL 25 MG/1
25 TABLET ORAL DAILY
Qty: 90 TABLET | Refills: 1 | Status: SHIPPED | OUTPATIENT
Start: 2024-08-06

## 2024-08-06 RX ORDER — ROSUVASTATIN CALCIUM 20 MG/1
TABLET, COATED ORAL
Qty: 90 TABLET | Refills: 1 | Status: SHIPPED | OUTPATIENT
Start: 2024-08-06

## 2024-08-06 RX ORDER — LEVOTHYROXINE SODIUM 75 UG/1
75 TABLET ORAL DAILY
Qty: 90 TABLET | Refills: 3 | Status: SHIPPED | OUTPATIENT
Start: 2024-08-06

## 2024-08-06 NOTE — PROGRESS NOTES
Subjective   Chief complaint: Lyla Hanson is a 58 y.o. female who presents for Med Refill and Breast Cancer Screening (Patient also requesting mammogram order).    HPI:  Virtual or Telephone Consent    An interactive audio and video telecommunication system which permits real time communications between the patient (at the originating site) and provider (at the distant site) was utilized to provide this telehealth service.   Verbal consent was requested and obtained from Lyla Hanson on this date, 08/06/24 for a telehealth visit.   Spoke with patient via video.  She reports that she will be needing medication refills.  No concerns with her medications.  Will also be needing a mammogram order again.  Reports that she last had her menses approximately 3 years ago but is still having some menopausal symptoms at this time.  She would like to know if she might be a candidate for new medication to help with symptoms.  She also reports that she was told that she has a thicker endometrial lining and needed to see a gynecologist for this as well.  She is willing to have a gynecology referral today.  No other concerns today, she has been feeling fine otherwise.      Med Refill        Objective   There were no vitals taken for this visit.  Physical Exam  General:  Alert, oriented, no acute distress  ENT:  No apparent nasal congestion.    Neck: Appears supple   Respiratory:  Normal breath sounds.  No dyspnea.  CNS:  No gross neurological deficits.  Speech clear.   Psychiatric:  Affect is positive and appropriate.  No depression.  No anxiety.    Review of Systems   I have reviewed and reconciled the medication list with the patient today.   Current Outpatient Medications:     ascorbic acid, vitamin C, 500 mg capsule, Take 1 capsule by mouth once daily., Disp: , Rfl:     azaTHIOprine (Imuran) 50 mg tablet, Take 3 tablets (150 mg) by mouth once daily., Disp: , Rfl:     cholecalciferol (Vitamin D-3) 25 MCG (1000 UT) tablet,  Take 5 tablets (5,000 Units) by mouth once daily., Disp: , Rfl:     cloNIDine (Catapres) 0.1 mg tablet, Take 1 tablet (0.1 mg) by mouth every 6 hours if needed for high blood pressure (Systolic blood pressure > 160 mm Hg)., Disp: 60 tablet, Rfl: 1    cyanocobalamin (Vitamin B-12) 100 mcg tablet, Take 1 tablet (100 mcg) by mouth once daily., Disp: , Rfl:     fluticasone (Flonase) 50 mcg/actuation nasal spray, Administer 1 spray into affected nostril(s) 2 times a day., Disp: , Rfl:     hydroxychloroquine (Plaquenil) 200 mg tablet, Take 2 tablets (400 mg) by mouth once daily., Disp: , Rfl:     ibuprofen 600 mg tablet, Take 1 tablet (600 mg) by mouth 3 times a day., Disp: , Rfl:     olmesartan (Benicar) 20 mg tablet, Take 1 tablet (20 mg) by mouth once daily., Disp: 90 tablet, Rfl: 3    ondansetron (Zofran) 4 mg tablet, Take 1 tablet (4 mg) by mouth every 8 hours if needed for nausea or vomiting., Disp: 21 tablet, Rfl: 1    atenolol (Tenormin) 25 mg tablet, Take 1 tablet (25 mg) by mouth once daily., Disp: 90 tablet, Rfl: 1    levothyroxine (Synthroid, Levoxyl) 75 mcg tablet, Take 1 tablet (75 mcg) by mouth once daily., Disp: 90 tablet, Rfl: 3    omeprazole (PriLOSEC) 40 mg DR capsule, Take 1 capsule (40 mg) by mouth once daily., Disp: 90 capsule, Rfl: 1    rosuvastatin (Crestor) 20 mg tablet, Take one by mouth once a day AT BEDTIME, Disp: 90 tablet, Rfl: 1     Imaging:  CT abdomen pelvis w IV contrast    Result Date: 7/23/2024  * * *Final Report* * * DATE OF EXAM: Jul 23 2024  9:21PM   [facility]   0530  -  CT ABD/PEL W IVCON  / ACCESSION #  [id number] PROCEDURE REASON: LLQ abdominal pain      * * * * Physician Interpretation * * * *  EXAMINATION:  CT ABDOMEN AND PELVIS WITH IV CONTRAST CLINICAL INFORMATION (AS PROVIDED BY ORDERING CLINICIAN) :  LEFT lower quadrant abdominal pain. TECHNIQUE: CT of the abdomen and pelvis was performed using standard technique, scanning from just above the dome of the diaphragm to the symphysis  pubis. IV contrast was administered. Multiplanar reconstructions performed. Contrast:  IV:  100 ml of Omnipaque 350 CT Radiation dose: Integrated Dose-length product (DLP) for this visit =   666 mGy*cm. CT Dose Reduction Employed:  Automated exposure control (AEC) COMPARISON:  None available RESULT: Limitation(s): None. :  Unremarkable. Lower Thorax:  Coronary artery calcifications. Liver:  No focal hepatic lesion.  Diffuse fatty infiltration. Biliary:  No biliary dilatation.  Nondilated gallbladder. Spleen: No abnormality identified. Pancreas: No abnormality identified.  No ductal dilatation. Adrenals:  No abnormality identified. Kidneys:  No focal enhancing lesion or hydronephrosis. Vasculature:  The aorta is of normal caliber with patent proximal branch vessels.  The portal, splenic, superior mesenteric and renal veins appear to be patent.  Atherosclerotic calcification. Mesentery/Peritoneum:  Small RIGHT free intraperitoneal fluid is presumably reactive. No free air. Retroperitoneum:  No mass. Lymph nodes:  No lymphadenopathy identified by CT size criteria. Pelvis: Urinary bladder is decompressed. The uterus is present with multiple fibroids favored, the largest measuring 3.4 cm. Small amount of free fluid in the pelvis. There are phleboliths. GI tract:  No dilated bowel. Colonic diverticulosis. There is focal wall thickening involving the distal descending colon with surrounding inflammation, most likely due to diverticulitis. Recommend follow-up after appropriate therapy/interval to ensure resolution of wall thickening. Nausea luminal gas or localized fluid collection. No free air. Colon is otherwise unremarkable with scattered diverticula. Normal caliber appendix noted. Small hiatal hernia. Body wall / Subcutaneous tissues:  No acute abnormality identified. Musculoskeletal:  No acute abnormality.  Degenerative changes within the spine.    IMPRESSION: Colonic diverticulosis. There is focal wall thickening  involving the distal descending colon with surrounding inflammation, most likely due to diverticulitis. Recommend follow-up after appropriate therapy/interval to ensure resolution of wall thickening. Nausea luminal gas or localized fluid collection. No free air. Small amount of free fluid is presumably reactive. Suspect uterine fibroids. Fatty infiltration of the liver. Small hiatal hernia. ACTIONABLE RESULT: FOLLOW-UP Acuity: Actionable Findings: Digestive Tract Routing Code:  GI_1 Recommendation: Unlisted Recommendation (see report) Time Frame: Additional evaluation as described in the impression COMMUNICATION: Results will be communicated with the ordering provider via Epic staff message or phone message by [facility] within 2 business days of report finalization. --END OF FINDING-- : PSC[facility]   Transcribe Date/Time: Jul 23 2024 10:21P Dictated by : DARREN CORDOBA MD This examination was interpreted and the report reviewed and electronically signed by: DARREN CORDOBA MD on Jul 23 2024 10:32PM  EST       Labs reviewed:    Lab Results   Component Value Date    WBC 4.3 (L) 01/12/2023    HGB 13.1 01/12/2023    HCT 41.3 01/12/2023     01/12/2023    CHOL 155 03/02/2024    TRIG 62 03/02/2024    HDL 71.4 03/02/2024    ALT 16 01/12/2023    AST 27 01/12/2023     (L) 03/02/2024    K 4.7 03/02/2024     03/02/2024    CREATININE 0.84 03/02/2024    BUN 12 03/02/2024    CO2 25 03/02/2024    TSH 1.80 03/02/2024    INR 1.1 04/08/2022    HGBA1C 5.8 (A) 02/26/2022       Assessment/Plan   Problem List Items Addressed This Visit       Benign essential hypertension     Medication refill given.         Relevant Medications    atenolol (Tenormin) 25 mg tablet    Encounter for medication refill     Medications reviewed.  Refills given.         Endometrial hyperplasia     Gynecology referral given.          Hypothyroidism     Medication refill given.         Relevant Medications     levothyroxine (Synthroid, Levoxyl) 75 mcg tablet    Menopause - Primary     Gynecology referral given.  Patient would like to discuss medication options.         Relevant Orders    Referral to Gynecology     Other Visit Diagnoses       Breast cancer screening by mammogram        Relevant Orders    BI mammo bilateral screening tomosynthesis    Gastro-esophageal reflux disease without esophagitis        Relevant Medications    omeprazole (PriLOSEC) 40 mg DR capsule    Hyperlipidemia, unspecified        Relevant Medications    rosuvastatin (Crestor) 20 mg tablet            Continue current medications as listed  Follow up in 6 months or sooner as needed.  Time Spent  Prep time on day of patient encounter: 3 minutes  Time spent directly with patient, family or caregiver: 8 minutes  Documentation Time: 11 minutes

## 2024-08-22 ENCOUNTER — TELEPHONE (OUTPATIENT)
Dept: PRIMARY CARE | Facility: CLINIC | Age: 59
End: 2024-08-22
Payer: COMMERCIAL

## 2024-08-22 DIAGNOSIS — R31.9 URINARY TRACT INFECTION WITH HEMATURIA, SITE UNSPECIFIED: ICD-10-CM

## 2024-08-22 DIAGNOSIS — N39.0 URINARY TRACT INFECTION WITH HEMATURIA, SITE UNSPECIFIED: ICD-10-CM

## 2024-08-22 NOTE — TELEPHONE ENCOUNTER
Patient left message on clinic line advising that [facility] and was diagnosed with [facility]. Patient stated that the physician at the [facility] told her to have her PCP put in an order to have a repeat UA done to make sure her UTI cleared up. Patient did advise that she has had 4 UTI's in the past couple months and she would like to be re-checked since she will be going out of town next week.  Ok for repeat UA orders?

## 2024-08-24 ENCOUNTER — LAB (OUTPATIENT)
Dept: LAB | Facility: LAB | Age: 59
End: 2024-08-24
Payer: COMMERCIAL

## 2024-08-24 DIAGNOSIS — N39.0 URINARY TRACT INFECTION WITH HEMATURIA, SITE UNSPECIFIED: ICD-10-CM

## 2024-08-24 DIAGNOSIS — R31.9 URINARY TRACT INFECTION WITH HEMATURIA, SITE UNSPECIFIED: ICD-10-CM

## 2024-08-24 LAB
APPEARANCE UR: CLEAR
BILIRUB UR STRIP.AUTO-MCNC: NEGATIVE MG/DL
COLOR UR: YELLOW
GLUCOSE UR STRIP.AUTO-MCNC: NORMAL MG/DL
KETONES UR STRIP.AUTO-MCNC: NEGATIVE MG/DL
LEUKOCYTE ESTERASE UR QL STRIP.AUTO: ABNORMAL
MUCOUS THREADS #/AREA URNS AUTO: ABNORMAL /LPF
NITRITE UR QL STRIP.AUTO: NEGATIVE
PH UR STRIP.AUTO: 6.5 [PH]
PROT UR STRIP.AUTO-MCNC: ABNORMAL MG/DL
RBC # UR STRIP.AUTO: ABNORMAL /UL
RBC #/AREA URNS AUTO: ABNORMAL /HPF
SP GR UR STRIP.AUTO: 1.02
SQUAMOUS #/AREA URNS AUTO: ABNORMAL /HPF
UROBILINOGEN UR STRIP.AUTO-MCNC: NORMAL MG/DL
WBC #/AREA URNS AUTO: ABNORMAL /HPF
WBC CLUMPS #/AREA URNS AUTO: ABNORMAL /HPF

## 2024-08-24 PROCEDURE — 87086 URINE CULTURE/COLONY COUNT: CPT | Mod: ELYLAB | Performed by: FAMILY MEDICINE

## 2024-08-24 PROCEDURE — 81001 URINALYSIS AUTO W/SCOPE: CPT

## 2024-08-26 ENCOUNTER — TELEMEDICINE (OUTPATIENT)
Dept: PRIMARY CARE | Facility: CLINIC | Age: 59
End: 2024-08-26
Payer: COMMERCIAL

## 2024-08-26 DIAGNOSIS — N30.91 HEMATURIA DUE TO CYSTITIS: Primary | ICD-10-CM

## 2024-08-26 DIAGNOSIS — N39.0 RECURRENT UTI: ICD-10-CM

## 2024-08-26 DIAGNOSIS — N39.0 RECURRENT UTI: Primary | ICD-10-CM

## 2024-08-26 PROCEDURE — 99214 OFFICE O/P EST MOD 30 MIN: CPT | Performed by: INTERNAL MEDICINE

## 2024-08-26 RX ORDER — NITROFURANTOIN 25; 75 MG/1; MG/1
100 CAPSULE ORAL 2 TIMES DAILY
Qty: 14 CAPSULE | Refills: 1 | Status: SHIPPED | OUTPATIENT
Start: 2024-08-26

## 2024-08-26 ASSESSMENT — PATIENT HEALTH QUESTIONNAIRE - PHQ9
1. LITTLE INTEREST OR PLEASURE IN DOING THINGS: NOT AT ALL
SUM OF ALL RESPONSES TO PHQ9 QUESTIONS 1 AND 2: 0
2. FEELING DOWN, DEPRESSED OR HOPELESS: NOT AT ALL

## 2024-08-27 LAB — BACTERIA UR CULT: NORMAL

## 2024-09-04 ASSESSMENT — ENCOUNTER SYMPTOMS
FEVER: 0
EYE REDNESS: 0
HEMATURIA: 0
ACTIVITY CHANGE: 0
PALPITATIONS: 0
BLOOD IN STOOL: 0
JOINT SWELLING: 0
STRIDOR: 0
SPEECH DIFFICULTY: 0
LIGHT-HEADEDNESS: 0
CHEST TIGHTNESS: 0

## 2024-09-05 NOTE — PROGRESS NOTES
Lyla Hanson, pleasant 58 y.o. female was seen today:   Chief Complaint   Patient presents with    Follow-up UTI     VISIT SUMMERY:  Lyla Hanson was on the phone conference with me today.  She was recently treated in urgent care for UTI.  She her repeat UA shows positive leukoesterase.  She also has UTI symptoms.  She is going out of town.  She needs antibiotic treatment.  She will be treated with nitrofurantoin.          TODAY'S VISIT  DX:     1. Recurrent UTI  nitrofurantoin, macrocrystal-monohydrate, (Macrobid) 100 mg capsule           MEDICAL DECISION MAKING:  - Treatment and therapy plan are as above   - Active medical co morbidities have been considered.   - Recent lab work and relevant imaging studies were reviewed.    - Correspondence/notes from specialty consultants were checked.    - Next Follow up in 3 months with PCP      Review of Systems   Constitutional:  Negative for activity change and fever.   HENT:  Negative for hearing loss, nosebleeds and tinnitus.    Eyes:  Negative for redness.   Respiratory:  Negative for chest tightness and stridor.    Cardiovascular:  Negative for chest pain, palpitations and leg swelling.   Gastrointestinal:  Negative for blood in stool.   Endocrine: Negative for cold intolerance.   Genitourinary:  Negative for hematuria.   Musculoskeletal:  Negative for joint swelling.   Skin:  Negative for rash.   Neurological:  Negative for speech difficulty and light-headedness.   Psychiatric/Behavioral:  Negative for behavioral problems.      Visit Vitals  Smoking Status Never         Wt Readings from Last 5 Encounters:   02/29/24 90.8 kg (200 lb 3.2 oz)   01/04/24 88.5 kg (195 lb)   12/19/23 90.3 kg (199 lb)   08/22/23 90.3 kg (199 lb)   08/14/23 91.3 kg (201 lb 4 oz)     Physical Exam  Constitutional:       General: She is not in acute distress.  HENT:      Nose: No rhinorrhea.   Pulmonary:      Effort: Pulmonary effort is normal.      Breath sounds: No stridor. No wheezing.    Neurological:      Mental Status: She is alert and oriented to person, place, and time.   Psychiatric:         Thought Content: Thought content normal.          MEDICATIONS:   Current Outpatient Medications   Medication Instructions    ascorbic acid, vitamin C, 500 mg capsule 1 capsule, oral, Daily    atenolol (TENORMIN) 25 mg, oral, Daily    azaTHIOprine (IMURAN) 150 mg, oral, Daily RT    cholecalciferol (VITAMIN D-3) 5,000 Units, oral, Daily    cloNIDine (CATAPRES) 0.1 mg, oral, Every 6 hours PRN    cyanocobalamin (VITAMIN B-12) 100 mcg, oral, Daily    fluticasone (Flonase) 50 mcg/actuation nasal spray 1 spray, nasal, 2 times daily, Pt uses PRN    hydroxychloroquine (PLAQUENIL) 400 mg, oral, Daily    levothyroxine (SYNTHROID, LEVOXYL) 75 mcg, oral, Daily    nitrofurantoin, macrocrystal-monohydrate, (Macrobid) 100 mg capsule 100 mg, oral, 2 times daily    olmesartan (BENICAR) 20 mg, oral, Daily    omeprazole (PRILOSEC) 40 mg, oral, Daily    ondansetron (ZOFRAN) 4 mg, oral, Every 8 hours PRN    rosuvastatin (Crestor) 20 mg tablet Take one by mouth once a day AT BEDTIME       RECENT LABS:  Lab Results   Component Value Date    WBC 4.3 (L) 01/12/2023    HGB 13.1 01/12/2023    HCT 41.3 01/12/2023     01/12/2023    CHOL 155 03/02/2024    TRIG 62 03/02/2024    HDL 71.4 03/02/2024    ALT 16 01/12/2023    AST 27 01/12/2023     (L) 03/02/2024    K 4.7 03/02/2024     03/02/2024    CREATININE 0.84 03/02/2024    BUN 12 03/02/2024    CO2 25 03/02/2024    TSH 1.80 03/02/2024    INR 1.1 04/08/2022    HGBA1C 5.8 (A) 02/26/2022     Lab Results   Component Value Date    GLUCOSE 90 03/02/2024    CALCIUM 9.6 03/02/2024     (L) 03/02/2024    K 4.7 03/02/2024    CO2 25 03/02/2024     03/02/2024    BUN 12 03/02/2024    CREATININE 0.84 03/02/2024      Lab Results   Component Value Date    LDLCALC 71 03/02/2024     Lab Results   Component Value Date    HGBA1C 5.8 (A) 02/26/2022    HGBA1C 6.6 (H) 01/06/2022     HGBA1C 9.0 (H) 07/23/2021     Lab Results   Component Value Date    LDLCALC 71 03/02/2024    CREATININE 0.84 03/02/2024             P.S: This note was completed using Dragon voice recognition technology and may include unintended errors with respect to translation of words, typographical errors or grammar errors which may not have been identified while finalizing the chart.

## 2024-10-03 ENCOUNTER — APPOINTMENT (OUTPATIENT)
Dept: OBSTETRICS AND GYNECOLOGY | Facility: CLINIC | Age: 59
End: 2024-10-03
Payer: COMMERCIAL

## 2024-10-29 DIAGNOSIS — I10 BENIGN ESSENTIAL HYPERTENSION: ICD-10-CM

## 2024-11-04 RX ORDER — OLMESARTAN MEDOXOMIL 20 MG/1
20 TABLET ORAL DAILY
Qty: 90 TABLET | Refills: 1 | Status: SHIPPED | OUTPATIENT
Start: 2024-11-04 | End: 2024-11-08 | Stop reason: SDUPTHER

## 2024-11-08 ENCOUNTER — OFFICE VISIT (OUTPATIENT)
Dept: CARDIOLOGY | Facility: CLINIC | Age: 59
End: 2024-11-08
Payer: COMMERCIAL

## 2024-11-08 VITALS
HEIGHT: 62 IN | DIASTOLIC BLOOD PRESSURE: 68 MMHG | WEIGHT: 181 LBS | BODY MASS INDEX: 33.31 KG/M2 | HEART RATE: 59 BPM | SYSTOLIC BLOOD PRESSURE: 112 MMHG

## 2024-11-08 DIAGNOSIS — E78.2 MIXED HYPERLIPIDEMIA: ICD-10-CM

## 2024-11-08 DIAGNOSIS — I10 BENIGN ESSENTIAL HYPERTENSION: ICD-10-CM

## 2024-11-08 DIAGNOSIS — R07.89 ATYPICAL CHEST PAIN: Primary | ICD-10-CM

## 2024-11-08 DIAGNOSIS — R00.2 PALPITATIONS: ICD-10-CM

## 2024-11-08 DIAGNOSIS — R93.1 AGATSTON CORONARY ARTERY CALCIUM SCORE BETWEEN 200 AND 399: ICD-10-CM

## 2024-11-08 PROBLEM — J01.90 ACUTE SINUS INFECTION: Status: RESOLVED | Noted: 2023-10-20 | Resolved: 2024-11-08

## 2024-11-08 PROCEDURE — 99213 OFFICE O/P EST LOW 20 MIN: CPT | Performed by: INTERNAL MEDICINE

## 2024-11-08 PROCEDURE — 3074F SYST BP LT 130 MM HG: CPT | Performed by: INTERNAL MEDICINE

## 2024-11-08 PROCEDURE — 3008F BODY MASS INDEX DOCD: CPT | Performed by: INTERNAL MEDICINE

## 2024-11-08 PROCEDURE — 3078F DIAST BP <80 MM HG: CPT | Performed by: INTERNAL MEDICINE

## 2024-11-08 PROCEDURE — 1036F TOBACCO NON-USER: CPT | Performed by: INTERNAL MEDICINE

## 2024-11-08 RX ORDER — OLMESARTAN MEDOXOMIL 20 MG/1
20 TABLET ORAL DAILY
Qty: 90 TABLET | Refills: 3 | Status: SHIPPED | OUTPATIENT
Start: 2024-11-08 | End: 2025-11-08

## 2024-11-08 ASSESSMENT — ENCOUNTER SYMPTOMS
PSYCHIATRIC NEGATIVE: 1
PALPITATIONS: 1
SHORTNESS OF BREATH: 0
ARTHRALGIAS: 1
ABDOMINAL PAIN: 1

## 2024-11-08 NOTE — PROGRESS NOTES
Patient:  Lyla Hanson  YOB: 1965  MRN: [id number]       Chief Complaint/Active Symptoms:       Lyla Hanson is a 59 y.o. female who returns today for cardiac follow-up.  Here for routine follow-up. Doing much better on the losartan. NO chest pain or discomfort. No shortness of breath, orthopnea or PND. Had a few palpitations or skipped beats while on mult-drug therapy for diverticulitis but that has resolved.     Review of Systems   Respiratory:  Negative for shortness of breath.    Cardiovascular:  Positive for palpitations. Negative for chest pain and leg swelling.   Gastrointestinal:  Positive for abdominal pain.   Musculoskeletal:  Positive for arthralgias.   Psychiatric/Behavioral: Negative.     All other systems reviewed and are negative.    Objective:     Vitals:    11/08/24 1103   BP: 112/68   Pulse: 59       Vitals:    11/08/24 1103   Weight: 82.1 kg (181 lb)       Allergies:     Allergies   Allergen Reactions    Bactrim [Sulfamethoxazole-Trimethoprim] Hives    Pseudoephedrine Hcl Unknown    Ciprofloxacin Palpitations and Other     Other reaction(s): Irregular Heartbeat      Medications:     Current Outpatient Medications   Medication Instructions    ascorbic acid, vitamin C, 500 mg capsule 1 capsule, Daily    atenolol (TENORMIN) 25 mg, oral, Daily    azaTHIOprine (IMURAN) 150 mg, Daily RT    cholecalciferol (VITAMIN D-3) 5,000 Units, Daily    cloNIDine (CATAPRES) 0.1 mg, oral, Every 6 hours PRN    cyanocobalamin (VITAMIN B-12) 100 mcg, Daily    fluticasone (Flonase) 50 mcg/actuation nasal spray 1 spray, 2 times daily    hydroxychloroquine (PLAQUENIL) 400 mg, Daily    levothyroxine (SYNTHROID, LEVOXYL) 75 mcg, oral, Daily    nitrofurantoin, macrocrystal-monohydrate, (Macrobid) 100 mg capsule 100 mg, oral, 2 times daily    olmesartan (BENICAR) 20 mg, oral, Daily    omeprazole (PRILOSEC) 40 mg, oral, Daily    ondansetron (ZOFRAN) 4 mg, oral, Every 8 hours PRN    rosuvastatin (Crestor) 20  "mg tablet Take one by mouth once a day AT BEDTIME       Physical Examination:   GENERAL:  Well developed, well nourished, in no acute distress.  HEENT: NC AT, EOMI with anicteric sclera  NECK:  Supple, no JVD, no bruit.  CHEST:  Symmetric and nontender.  LUNGS:  Clear to auscultation bilaterally, normal respiratory effort.  HEART:  PMI is nondisplaced. RRR with normal S1 and S2, no S3, no mumur or rub. No carotid or abdominal bruits  ABDOMEN: Soft, NT, ND without palpable organomegaly or bruits  EXTREMITIES:  Warm with good color, no clubbing or cyanosis.  There is no edema noted.  PERIPHERAL VASCULAR:  Pulses present and equally palpable; 2+ throughout.  MUSCULOSKELETAL: No significant joint or limb deformity  NEURO/PSYCH:  Alert and oriented times three with approppriate behavior and responses. Nonfocal motor examination with normal gait and ambulation  Lymph: No significant palpable lymphadenopathy  Skin: no rash or lesions on exposed skin or reported.    Lab:     CBC:   Lab Results   Component Value Date    WBC 4.3 (L) 01/12/2023    RBC 4.65 01/12/2023    HGB 13.1 01/12/2023    HCT 41.3 01/12/2023     01/12/2023        CMP:    Lab Results   Component Value Date     (L) 03/02/2024    K 4.7 03/02/2024     03/02/2024    CO2 25 03/02/2024    BUN 12 03/02/2024    CREATININE 0.84 03/02/2024    GLUCOSE 90 03/02/2024    CALCIUM 9.6 03/02/2024       Magnesium:    Lab Results   Component Value Date    MG 2.06 03/02/2024       Lipid Profile:    Lab Results   Component Value Date    TRIG 62 03/02/2024    HDL 71.4 03/02/2024    LDLCALC 71 03/02/2024       TSH:    Lab Results   Component Value Date    TSH 1.80 03/02/2024       BNP:   No results found for: \"BNP\"     PT/INR:    Lab Results   Component Value Date    PROTIME 12.5 04/08/2022    INR 1.1 04/08/2022       HgBA1c:    Lab Results   Component Value Date    HGBA1C 5.8 (A) 02/26/2022       BMP:  Lab Results   Component Value Date     (L) " "03/02/2024     01/12/2023     04/08/2022     12/08/2021    K 4.7 03/02/2024    K 4.3 01/12/2023    K 4.7 08/16/2022    K 5.4 (H) 08/08/2022     03/02/2024     01/12/2023     04/08/2022     12/08/2021    CO2 25 03/02/2024    CO2 31 01/12/2023    CO2 26 04/08/2022    CO2 32 12/08/2021    BUN 12 03/02/2024    BUN 9 01/12/2023    BUN 8 04/08/2022    BUN 9 12/08/2021    CREATININE 0.84 03/02/2024    CREATININE 0.87 01/12/2023    CREATININE 0.77 04/08/2022    CREATININE 0.86 12/08/2021       Cardiac Enzymes:    No results found for: \"TROPHS\"    Hepatic Function Panel:    Lab Results   Component Value Date    ALKPHOS 79 01/12/2023    ALT 16 01/12/2023    AST 27 01/12/2023    PROT 6.9 01/12/2023    BILITOT 0.6 01/12/2023         Diagnostic Studies:     Nuclear stress test within the past year showed normal perfusion and ejection fraction    Radiology:     No orders to display     ASSESSMENT     Problem List Items Addressed This Visit       Agatston coronary artery calcium score between 200 and 399    Relevant Orders    Follow Up In Cardiology    Benign essential hypertension    Relevant Medications    olmesartan (BENIcar) 20 mg tablet    Other Relevant Orders    Follow Up In Cardiology    Hyperlipidemia    Relevant Orders    Follow Up In Cardiology    Palpitations    RESOLVED: Atypical chest pain - Primary       PLAN     1.  Atypical chest pain with coronary calcium score between 2 and 400.  Her stress test was normal or low risk and chest discomfort has resolved.  We continue with risk factor modification and clinical observation.  2.  Benign essential hypertension.  Patient has responded well to the change to olmesartan.  We have renewed her medication for the next year would continue the same and periodically follow kidney function electrolytes.  3.  Hyperlipidemia.  On statin therapy because of her coronary calcium score.  She will be due for her annual labs in March we will " adjust her medication as clinically appropriate once those are resolved her LDL cholesterol earlier this year was at goal.  4.  Palpitations.  Benign by description would follow her clinically.    Will see the patient in the office in follow-up in a years time as long as she continues to do well we will see her sooner if there is any questions or concerns

## 2024-12-13 ENCOUNTER — APPOINTMENT (OUTPATIENT)
Dept: CARDIOLOGY | Facility: CLINIC | Age: 59
End: 2024-12-13
Payer: COMMERCIAL

## 2025-01-06 DIAGNOSIS — K21.9 GASTRO-ESOPHAGEAL REFLUX DISEASE WITHOUT ESOPHAGITIS: ICD-10-CM

## 2025-01-06 NOTE — TELEPHONE ENCOUNTER
Pharmacy Rx Request    Last OV: 2-9-2024  Pending OV: None    Please schedule future Office Visit.

## 2025-01-08 RX ORDER — OMEPRAZOLE 40 MG/1
40 CAPSULE, DELAYED RELEASE ORAL DAILY
Qty: 90 CAPSULE | Refills: 1 | Status: SHIPPED | OUTPATIENT
Start: 2025-01-08

## 2025-02-18 ENCOUNTER — OFFICE VISIT (OUTPATIENT)
Dept: CARDIOLOGY | Facility: CLINIC | Age: 60
End: 2025-02-18
Payer: COMMERCIAL

## 2025-02-18 VITALS
HEART RATE: 60 BPM | DIASTOLIC BLOOD PRESSURE: 70 MMHG | WEIGHT: 185 LBS | SYSTOLIC BLOOD PRESSURE: 126 MMHG | HEIGHT: 62 IN | BODY MASS INDEX: 34.04 KG/M2

## 2025-02-18 DIAGNOSIS — I10 BENIGN ESSENTIAL HYPERTENSION: ICD-10-CM

## 2025-02-18 DIAGNOSIS — R07.89 ATYPICAL CHEST PAIN: Primary | ICD-10-CM

## 2025-02-18 DIAGNOSIS — R93.1 AGATSTON CORONARY ARTERY CALCIUM SCORE BETWEEN 200 AND 399: ICD-10-CM

## 2025-02-18 DIAGNOSIS — E78.2 MIXED HYPERLIPIDEMIA: ICD-10-CM

## 2025-02-18 PROCEDURE — 99213 OFFICE O/P EST LOW 20 MIN: CPT | Performed by: INTERNAL MEDICINE

## 2025-02-18 PROCEDURE — 3078F DIAST BP <80 MM HG: CPT | Performed by: INTERNAL MEDICINE

## 2025-02-18 PROCEDURE — 3074F SYST BP LT 130 MM HG: CPT | Performed by: INTERNAL MEDICINE

## 2025-02-18 PROCEDURE — 93000 ELECTROCARDIOGRAM COMPLETE: CPT | Performed by: INTERNAL MEDICINE

## 2025-02-18 PROCEDURE — 3008F BODY MASS INDEX DOCD: CPT | Performed by: INTERNAL MEDICINE

## 2025-02-18 PROCEDURE — 1036F TOBACCO NON-USER: CPT | Performed by: INTERNAL MEDICINE

## 2025-02-18 ASSESSMENT — ENCOUNTER SYMPTOMS
GASTROINTESTINAL NEGATIVE: 1
MUSCULOSKELETAL NEGATIVE: 1
CARDIOVASCULAR NEGATIVE: 1

## 2025-02-18 NOTE — PROGRESS NOTES
"  Patient:  Lyla Hanson  YOB: 1965  MRN: [id number]       Chief Complaint/Active Symptoms:       Lyla Hanson is a 59 y.o. female who returns today for cardiac follow-up.    Here due to random \"squeezing\" pain in her chest. Had it several weeks ago - then every day this past weekend. Not predictable, a quick squeeze that takes her breath away for a second and then it is gone. Does not repeat and occurs randomly. Feels like someone grabs her, squeezes, and then lets go. Usually occurs only once a day, sometimes several days in a row, then may not come for several days.     Outside of that she is trying to optimize her cardiovascular health.  She is interested in getting to a healthier body weight.  Over the past 2 years she is lost over 40 pounds but she is stuck at her current weight and is very frustrated as she seems to hold all of her obesity centrally.  She is wondering if there is any other measures that she can take and had some questions about the GLP-1 inhibitors.  We discussed these with her and she also had questions about hormone replacement therapy we discussed some of the pluses and minuses from a cardiovascular standpoint for that as well.  We have encouraged her to have these discussions with her primary care physician and OB/GYN.    Review of Systems   Cardiovascular: Negative.    Gastrointestinal: Negative.    Musculoskeletal: Negative.    All other systems reviewed and are negative.      Objective:     Vitals:    02/18/25 1029   BP: 126/70   Pulse: 60       Vitals:    02/18/25 1029   Weight: 83.9 kg (185 lb)       Allergies:     Allergies   Allergen Reactions    Bactrim [Sulfamethoxazole-Trimethoprim] Hives    Pseudoephedrine Hcl Unknown    Ciprofloxacin Palpitations and Other     Other reaction(s): Irregular Heartbeat          Medications:     Current Outpatient Medications   Medication Instructions    ascorbic acid, vitamin C, 500 mg capsule 1 capsule, Daily    atenolol " (TENORMIN) 25 mg, oral, Daily    azaTHIOprine (IMURAN) 150 mg, Daily RT    cholecalciferol (VITAMIN D-3) 5,000 Units, Daily    cloNIDine (CATAPRES) 0.1 mg, oral, Every 6 hours PRN    cyanocobalamin (VITAMIN B-12) 100 mcg, Daily    fluticasone (Flonase) 50 mcg/actuation nasal spray 1 spray, 2 times daily    hydroxychloroquine (PLAQUENIL) 400 mg, Daily    levothyroxine (SYNTHROID, LEVOXYL) 75 mcg, oral, Daily    olmesartan (BENICAR) 20 mg, oral, Daily    omeprazole (PRILOSEC) 40 mg, oral, Daily    ondansetron (ZOFRAN) 4 mg, oral, Every 8 hours PRN    rosuvastatin (Crestor) 20 mg tablet Take one by mouth once a day AT BEDTIME       Physical Examination:   GENERAL:  Well developed, well nourished, in no acute distress.  HEENT: NC AT, EOMI with anicteric sclera  NECK:  Supple, no JVD, no bruit.  CHEST:  Symmetric and nontender.  LUNGS:  Clear to auscultation bilaterally, normal respiratory effort.  HEART:  PMI is nondisplaced. RRR with normal S1 and S2, no S3, no mumur or rub. No carotid or abdominal bruits  EXTREMITIES:  Warm with good color, no clubbing or cyanosis.  There is no edema noted.  NEURO/PSYCH:  Alert and oriented times three with approppriate behavior and responses. Nonfocal motor examination with normal gait and ambulation      Lab:     CBC:   Lab Results   Component Value Date    WBC 4.3 (L) 01/12/2023    RBC 4.65 01/12/2023    HGB 13.1 01/12/2023    HCT 41.3 01/12/2023     01/12/2023        CMP:    Lab Results   Component Value Date     (L) 03/02/2024    K 4.7 03/02/2024     03/02/2024    CO2 25 03/02/2024    BUN 12 03/02/2024    CREATININE 0.84 03/02/2024    GLUCOSE 90 03/02/2024    CALCIUM 9.6 03/02/2024       Magnesium:    Lab Results   Component Value Date    MG 2.06 03/02/2024       Lipid Profile:    Lab Results   Component Value Date    TRIG 62 03/02/2024    HDL 71.4 03/02/2024    LDLCALC 71 03/02/2024       TSH:    Lab Results   Component Value Date    TSH 1.80 03/02/2024  "      BNP:   No results found for: \"BNP\"     PT/INR:    Lab Results   Component Value Date    PROTIME 12.5 04/08/2022    INR 1.1 04/08/2022       HgBA1c:    Lab Results   Component Value Date    HGBA1C 5.8 (A) 02/26/2022       BMP:  Lab Results   Component Value Date     (L) 03/02/2024     01/12/2023     04/08/2022     12/08/2021    K 4.7 03/02/2024    K 4.3 01/12/2023    K 4.7 08/16/2022    K 5.4 (H) 08/08/2022     03/02/2024     01/12/2023     04/08/2022     12/08/2021    CO2 25 03/02/2024    CO2 31 01/12/2023    CO2 26 04/08/2022    CO2 32 12/08/2021    BUN 12 03/02/2024    BUN 9 01/12/2023    BUN 8 04/08/2022    BUN 9 12/08/2021    CREATININE 0.84 03/02/2024    CREATININE 0.87 01/12/2023    CREATININE 0.77 04/08/2022    CREATININE 0.86 12/08/2021       Cardiac Enzymes:    No results found for: \"TROPHS\"    Hepatic Function Panel:    Lab Results   Component Value Date    ALKPHOS 79 01/12/2023    ALT 16 01/12/2023    AST 27 01/12/2023    PROT 6.9 01/12/2023    BILITOT 0.6 01/12/2023     Labs reviewed    Diagnostic Studies:     EKG:   EKG today - NSR, normal EKG    Radiology:     No orders to display     ASSESSMENT     Problem List Items Addressed This Visit       Agatston coronary artery calcium score between 200 and 399    Benign essential hypertension    Relevant Orders    ECG 12 lead (Clinic Performed) (Completed)    Hyperlipidemia    Atypical chest pain - Primary       PLAN     1.  Atypical chest pain.  Patient's chest discomfort is described is not suggestive of angina pectoris.  For now given her stress testing in 2023 would recommend continued clinical observation and risk factor modification.  2.  Coronary artery calcium score between 2 and 400, hyperlipidemia.  Patient is on statin therapy would continue clinical follow-up and risk factor modification.  Would consider repeat CT cardiac score next year.  3.  Hypertension.  Blood pressures are controlled.  We " have answered the patient's questions regarding to her hormone replacement therapy and GLP-1 inhibitors.  Will be happy to answer any additional questions that she has.    Will see her as scheduled in the fall fall sooner if she has any new issues.

## 2025-02-28 ENCOUNTER — APPOINTMENT (OUTPATIENT)
Dept: PRIMARY CARE | Facility: CLINIC | Age: 60
End: 2025-02-28
Payer: COMMERCIAL

## 2025-02-28 DIAGNOSIS — M32.9 SYSTEMIC LUPUS ERYTHEMATOSUS, UNSPECIFIED SLE TYPE, UNSPECIFIED ORGAN INVOLVEMENT STATUS (MULTI): ICD-10-CM

## 2025-02-28 DIAGNOSIS — I10 BENIGN ESSENTIAL HYPERTENSION: ICD-10-CM

## 2025-02-28 DIAGNOSIS — Z76.0 ENCOUNTER FOR MEDICATION REFILL: ICD-10-CM

## 2025-02-28 DIAGNOSIS — E03.9 ACQUIRED HYPOTHYROIDISM: ICD-10-CM

## 2025-02-28 DIAGNOSIS — E78.2 MIXED HYPERLIPIDEMIA: ICD-10-CM

## 2025-02-28 DIAGNOSIS — R73.01 IFG (IMPAIRED FASTING GLUCOSE): Primary | ICD-10-CM

## 2025-02-28 DIAGNOSIS — F41.9 ANXIETY: ICD-10-CM

## 2025-02-28 DIAGNOSIS — K21.9 GASTRO-ESOPHAGEAL REFLUX DISEASE WITHOUT ESOPHAGITIS: ICD-10-CM

## 2025-02-28 PROBLEM — E78.5 HYPERLIPIDEMIA, UNSPECIFIED: Status: ACTIVE | Noted: 2025-02-28

## 2025-02-28 RX ORDER — HYDROXYZINE HYDROCHLORIDE 10 MG/1
TABLET, FILM COATED ORAL
Qty: 30 TABLET | Refills: 3 | Status: SHIPPED | OUTPATIENT
Start: 2025-02-28

## 2025-02-28 RX ORDER — LEVOTHYROXINE SODIUM 75 UG/1
75 TABLET ORAL DAILY
Qty: 90 TABLET | Refills: 3 | Status: SHIPPED | OUTPATIENT
Start: 2025-02-28

## 2025-02-28 RX ORDER — ROSUVASTATIN CALCIUM 20 MG/1
TABLET, COATED ORAL
Qty: 90 TABLET | Refills: 3 | Status: SHIPPED | OUTPATIENT
Start: 2025-02-28

## 2025-02-28 RX ORDER — ATENOLOL 25 MG/1
25 TABLET ORAL DAILY
Qty: 90 TABLET | Refills: 3 | Status: SHIPPED | OUTPATIENT
Start: 2025-02-28

## 2025-02-28 RX ORDER — ALPRAZOLAM 0.5 MG/1
TABLET ORAL
Qty: 16 TABLET | Refills: 0 | Status: SHIPPED | OUTPATIENT
Start: 2025-02-28

## 2025-02-28 RX ORDER — OMEPRAZOLE 40 MG/1
40 CAPSULE, DELAYED RELEASE ORAL DAILY
Qty: 90 CAPSULE | Refills: 3 | Status: SHIPPED | OUTPATIENT
Start: 2025-02-28

## 2025-02-28 ASSESSMENT — PATIENT HEALTH QUESTIONNAIRE - PHQ9
1. LITTLE INTEREST OR PLEASURE IN DOING THINGS: NOT AT ALL
2. FEELING DOWN, DEPRESSED OR HOPELESS: NOT AT ALL
SUM OF ALL RESPONSES TO PHQ9 QUESTIONS 1 AND 2: 0

## 2025-02-28 NOTE — PROGRESS NOTES
Subjective   Chief complaint: Lyla Hanson is a 59 y.o. female who presents for Med Refill and Lab Orders.    HPI:  Virtual or Telephone Consent    An interactive audio and video telecommunication system which permits real time communications between the patient (at the originating site) and provider (at the distant site) was utilized to provide this telehealth service.   Verbal consent was requested and obtained from Lyla Hanson on this date, 02/28/25 for a telehealth visit and the patient's location was confirmed at the time of the visit.  Patient reports that she needs to have updated labs done.  She is asking if an ESR can be added because she has lupus and will be seeing her rheumatologist soon.  She has knee pain and ankle pain.  She wanted to know about her inflammation level.  She also is asking about getting an A1c level.  In the past she did have impaired fasting glucose.  Saw the cardiologist last week.  Plans to be traveling in April and has some anxiety with traveling.  She would like to get a few tablets of Xanax if possible or hydroxyzine to be used if needed.      Med Refill      Objective   There were no vitals taken for this visit.  Physical Exam  Alert, pleasant, no acute distress  Respirations nonlabored, no dyspnea  Mood is appropriate, no depression, no anxiety  Review of Systems   I have reviewed and reconciled the medication list with the patient today.   Current Outpatient Medications:   •  ascorbic acid, vitamin C, 500 mg capsule, Take 1 capsule by mouth once daily., Disp: , Rfl:   •  cholecalciferol (Vitamin D-3) 25 MCG (1000 UT) tablet, Take 5 tablets (5,000 Units) by mouth once daily., Disp: , Rfl:   •  cloNIDine (Catapres) 0.1 mg tablet, Take 1 tablet (0.1 mg) by mouth every 6 hours if needed for high blood pressure (Systolic blood pressure > 160 mm Hg)., Disp: 60 tablet, Rfl: 1  •  cyanocobalamin (Vitamin B-12) 100 mcg tablet, Take 1 tablet (100 mcg) by mouth once daily., Disp: ,  Rfl:   •  fluticasone (Flonase) 50 mcg/actuation nasal spray, Administer 1 spray into affected nostril(s) 2 times a day. Pt uses PRN, Disp: , Rfl:   •  olmesartan (BENIcar) 20 mg tablet, Take 1 tablet (20 mg) by mouth once daily., Disp: 90 tablet, Rfl: 3  •  ondansetron (Zofran) 4 mg tablet, Take 1 tablet (4 mg) by mouth every 8 hours if needed for nausea or vomiting., Disp: 21 tablet, Rfl: 1  •  ALPRAZolam (Xanax) 0.5 mg tablet, One half tablet as needed for anxiety at start of travel.  May repeat after one hour as needed., Disp: 16 tablet, Rfl: 0  •  atenolol (Tenormin) 25 mg tablet, Take 1 tablet (25 mg) by mouth once daily., Disp: 90 tablet, Rfl: 3  •  hydrOXYzine HCL (Atarax) 10 mg tablet, Take one to two tablets as needed for anxiety., Disp: 30 tablet, Rfl: 3  •  levothyroxine (Synthroid, Levoxyl) 75 mcg tablet, Take 1 tablet (75 mcg) by mouth once daily., Disp: 90 tablet, Rfl: 3  •  omeprazole (PriLOSEC) 40 mg DR capsule, Take 1 capsule (40 mg) by mouth once daily., Disp: 90 capsule, Rfl: 3  •  rosuvastatin (Crestor) 20 mg tablet, Take one by mouth once a day AT BEDTIME, Disp: 90 tablet, Rfl: 3     Imaging:  US pelvis limited    Result Date: 2/27/2025  * * *Final Report* * * DATE OF EXAM: Feb 27 2025  3:36PM   M2U   1059  -  US FEMALE PELVIS TRANSABD LTD  / ACCESSION #  [id number] PROCEDURE REASON: Abnormal CT results - evaluate endo      * * * * Physician Interpretation * * * *  EXAMINATION:   TRANSVAGINAL AND LIMITED TRANSABDOMINAL FEMALE PELVIC ULTRASOUND CLINICAL HISTORY:  Abnormal CT TECHNIQUE: Sonography of the pelvis was performed by transvaginal and transabdominal (limited) techniques.  Images were obtained and stored in a permanent archive. MQ:  UFP_2022 COMPARISON: CT 07/23/2024 RESULT: Uterus: -Size: 8.4 x 4.1 x 5.2 cm -Orientation: Anteverted -Endometrial echo complex: Evaluation of the endometrium was inadequate with measurements ranging from 0.5 to 1.5 cm. -Cervix: Unremarkable. -Adenomyosis  assessment: There are no sonographic findings of adenomyosis. -Fibroids: 3.1 cm left uterine body fibroid. Right Ovary: Not identified. Left Ovary: 1.9 x 1.6 x 1.5 cm  - Normal sonographic appearance. Free Fluid: No abnormal free fluid is present.    IMPRESSION: Suboptimal evaluation of the endometrium with thickness measurements ranging from 0.5 to 1.5 cm.  A sonohysterogram could be obtained for further evaluation. 3.1 cm uterine fibroid. : P[facility]   Transcribe Date/Time: Feb 27 2025  4:09P Dictated by : HANNAH NAILS MD This examination was interpreted and the report reviewed and electronically signed by: HANNAH NAILS MD on Feb 27 2025  4:23PM  EST    US pelvis transvaginal    Result Date: 2/27/2025  * * *Final Report* * * DATE OF EXAM: Feb 27 2025  3:36PM   M2U   1060  -  US FEMALE PELVIS TRANSVAG  / ACCESSION #  [id number] PROCEDURE REASON: Abnormal CT results - evaluate endo      * * * * Physician Interpretation * * * *  EXAMINATION:   TRANSVAGINAL AND LIMITED TRANSABDOMINAL FEMALE PELVIC ULTRASOUND CLINICAL HISTORY:  Abnormal CT TECHNIQUE: Sonography of the pelvis was performed by transvaginal and transabdominal (limited) techniques.  Images were obtained and stored in a permanent archive. MQ:  UFP_2022 COMPARISON: CT 07/23/2024 RESULT: Uterus: -Size: 8.4 x 4.1 x 5.2 cm -Orientation: Anteverted -Endometrial echo complex: Evaluation of the endometrium was inadequate with measurements ranging from 0.5 to 1.5 cm. -Cervix: Unremarkable. -Adenomyosis assessment: There are no sonographic findings of adenomyosis. -Fibroids: 3.1 cm left uterine body fibroid. Right Ovary: Not identified. Left Ovary: 1.9 x 1.6 x 1.5 cm  - Normal sonographic appearance. Free Fluid: No abnormal free fluid is present.    IMPRESSION: Suboptimal evaluation of the endometrium with thickness measurements ranging from 0.5 to 1.5 cm.  A sonohysterogram could be obtained for further evaluation. 3.1 cm uterine fibroid.  : NATHAN   Transcribe Date/Time: Feb 27 2025  4:09P Dictated by : HANNAH NAILS MD This examination was interpreted and the report reviewed and electronically signed by: HANNAH NAILS MD on Feb 27 2025  4:23PM  EST    ECG 12 lead (Clinic Performed)    Result Date: 2/18/2025  EKG today - NSR, normal EKG       Labs reviewed:    Lab Results   Component Value Date    WBC 4.3 (L) 01/12/2023    HGB 13.1 01/12/2023    HCT 41.3 01/12/2023     01/12/2023    CHOL 155 03/02/2024    TRIG 62 03/02/2024    HDL 71.4 03/02/2024    ALT 16 01/12/2023    AST 27 01/12/2023     (L) 03/02/2024    K 4.7 03/02/2024     03/02/2024    CREATININE 0.84 03/02/2024    BUN 12 03/02/2024    CO2 25 03/02/2024    TSH 1.80 03/02/2024    INR 1.1 04/08/2022    HGBA1C 5.8 (A) 02/26/2022       Assessment/Plan   Problem List Items Addressed This Visit       Anxiety     Anxiety regarding travel.  Prescription given.           Relevant Medications    hydrOXYzine HCL (Atarax) 10 mg tablet    ALPRAZolam (Xanax) 0.5 mg tablet    Other Relevant Orders    CBC and Auto Differential    TSH with reflex to Free T4 if abnormal    Benign essential hypertension     Medication reviewed.  Refill given.         Relevant Medications    atenolol (Tenormin) 25 mg tablet    Other Relevant Orders    CBC and Auto Differential    TSH with reflex to Free T4 if abnormal    Encounter for medication refill     Medication reviewed.  Refills given.         Hyperlipidemia, unspecified     Lab order on chart.         Relevant Medications    rosuvastatin (Crestor) 20 mg tablet    Other Relevant Orders    Comprehensive Metabolic Panel    Lipid Panel    Hypothyroidism     Medication refill given.         Relevant Medications    levothyroxine (Synthroid, Levoxyl) 75 mcg tablet    Other Relevant Orders    TSH with reflex to Free T4 if abnormal    IFG (impaired fasting glucose) - Primary     Ordering an A1c.          Relevant Orders    Comprehensive  Metabolic Panel    Hemoglobin A1C    Lupus (systemic lupus erythematosus) (Multi)     Patient will be following with her rheumatologist.         Relevant Orders    Sedimentation Rate     Other Visit Diagnoses       Gastro-esophageal reflux disease without esophagitis        Relevant Medications    omeprazole (PriLOSEC) 40 mg DR capsule    Other Relevant Orders    Magnesium    BMI 33.0-33.9,adult        Relevant Orders    Vitamin D 25-Hydroxy,Total (for eval of Vitamin D levels)            Continue current medications as listed  Follow up pending lab results.  Time Spent  Time spent directly with patient, family or caregiver: 12 minutes  Documentation Time: 10 minutes

## 2025-04-07 ENCOUNTER — TELEPHONE (OUTPATIENT)
Dept: PRIMARY CARE | Facility: CLINIC | Age: 60
End: 2025-04-07
Payer: COMMERCIAL

## 2025-04-07 DIAGNOSIS — K57.92 DIVERTICULITIS: Primary | ICD-10-CM

## 2025-04-07 RX ORDER — METRONIDAZOLE 500 MG/1
500 TABLET ORAL 2 TIMES DAILY
Qty: 14 TABLET | Refills: 0 | Status: SHIPPED | OUTPATIENT
Start: 2025-04-07 | End: 2025-04-14

## 2025-04-07 RX ORDER — CEFUROXIME AXETIL 250 MG/1
250 TABLET ORAL 2 TIMES DAILY
Qty: 14 TABLET | Refills: 0 | Status: SHIPPED | OUTPATIENT
Start: 2025-04-07 | End: 2025-04-14

## 2025-04-07 NOTE — TELEPHONE ENCOUNTER
Patient left voicemail   States you were going to send in 2 rx's for her for diverticulitis flare ups but they aren't at the pharmacy and she believes she is having a flare up    Requesting Ceftin and Flagyl be called in for her  (Per her allergy list looks like she as an irregular heart beat with Cipro)    Please advise

## 2025-05-27 ENCOUNTER — APPOINTMENT (OUTPATIENT)
Dept: PRIMARY CARE | Facility: CLINIC | Age: 60
End: 2025-05-27
Payer: COMMERCIAL

## 2025-05-27 VITALS
OXYGEN SATURATION: 99 % | TEMPERATURE: 98.2 F | HEIGHT: 62 IN | SYSTOLIC BLOOD PRESSURE: 130 MMHG | BODY MASS INDEX: 35.33 KG/M2 | DIASTOLIC BLOOD PRESSURE: 78 MMHG | HEART RATE: 66 BPM | WEIGHT: 192 LBS

## 2025-05-27 DIAGNOSIS — D84.9 IMMUNOSUPPRESSION: ICD-10-CM

## 2025-05-27 DIAGNOSIS — M33.10: ICD-10-CM

## 2025-05-27 DIAGNOSIS — M33.90 DERMATOPOLYMYOSITIS, UNSPECIFIED, ORGAN INVOLVEMENT UNSPECIFIED (MULTI): ICD-10-CM

## 2025-05-27 DIAGNOSIS — N63.31 MASS OF AXILLARY TAIL OF RIGHT BREAST: Primary | ICD-10-CM

## 2025-05-27 DIAGNOSIS — R76.8 ANA POSITIVE: ICD-10-CM

## 2025-05-27 DIAGNOSIS — M33.13 DERMATOMYOSITIS (MULTI): ICD-10-CM

## 2025-05-27 DIAGNOSIS — Z00.00 HEALTHCARE MAINTENANCE: ICD-10-CM

## 2025-05-27 DIAGNOSIS — Z76.89 ENCOUNTER FOR WEIGHT MANAGEMENT: ICD-10-CM

## 2025-05-27 PROCEDURE — 3008F BODY MASS INDEX DOCD: CPT | Performed by: FAMILY MEDICINE

## 2025-05-27 PROCEDURE — 99214 OFFICE O/P EST MOD 30 MIN: CPT | Performed by: FAMILY MEDICINE

## 2025-05-27 PROCEDURE — 3078F DIAST BP <80 MM HG: CPT | Performed by: FAMILY MEDICINE

## 2025-05-27 PROCEDURE — 3075F SYST BP GE 130 - 139MM HG: CPT | Performed by: FAMILY MEDICINE

## 2025-05-27 NOTE — ASSESSMENT & PLAN NOTE
Patient will check on insurance coverage for GLP-1s.  Nutritionist and weight management support ordered.

## 2025-05-27 NOTE — PROGRESS NOTES
"Patient sees rheumatology.  Subjective   Chief complaint: Lyla Hanson is a 59 y.o. female who presents for Edema (Right side ).    HPI:  Here today for a lump she found at the outside area of her right breast.   It is near the axilla.  It is the size of an almond.  Non-tender.    Second concern is regarding her weight.  Plans on checking her insurance coverage for GLP-1 medications.  Would also like to speak with a nutritionist about diet changes.  Is willing to get any assistance with weight management.  Has a rheumatologist.      Edema      Objective   /78 (BP Location: Right arm, Patient Position: Sitting, BP Cuff Size: Large adult)   Pulse 66   Temp 36.8 °C (98.2 °F) (Temporal)   Ht 1.575 m (5' 2\")   Wt 87.1 kg (192 lb)   SpO2 99%   BMI 35.12 kg/m²   Physical Exam  General:  Alert, oriented, no acute distress  Eyes:  Sclerae white, PER, conjunctivae clear  ENT:  No nasal congestion.    Neck: Supple  Axilla / Lymph:  Right breast upper and outer area near the axilla is a mobile, firm, nodule approximately 2 cm x 1 cm in size.  No skin surface erythema or changes.  No warmth, streaking or other skin changes.  No tenderness.  Slightly mobile.    Respiratory:  Normal breath sounds.  No wheezing, rhonchi nor crackles.  No dyspnea.  Cardiovascular:  S1 and S2 positive.  Regular rate and rhythm.  No gallops.  No murmurs.  Vascular:  No edema.  Skin warm and dry.  CNS:  No gross neurological deficits.  Gait within normal limits.    Psychiatric:  Affect is positive and appropriate.  No depression.  No anxiety.    Review of Systems   I have reviewed and reconciled the medication list with the patient today. Current Medications[1]     Imaging:  Imaging  No results found.    Cardiology, Vascular, and Other Imaging  No other imaging results found for the past 7 days       Labs reviewed:    Lab Results   Component Value Date    WBC 4.3 (L) 01/12/2023    HGB 13.1 01/12/2023    HCT 41.3 01/12/2023     " 01/12/2023    CHOL 155 03/02/2024    TRIG 62 03/02/2024    HDL 71.4 03/02/2024    ALT 16 01/12/2023    AST 27 01/12/2023     (L) 03/02/2024    K 4.7 03/02/2024     03/02/2024    CREATININE 0.84 03/02/2024    BUN 12 03/02/2024    CO2 25 03/02/2024    TSH 1.80 03/02/2024    INR 1.1 04/08/2022    HGBA1C 5.8 (H) 03/14/2025       Assessment/Plan   Problem List Items Addressed This Visit       CARMELITA positive    Patient follow with rheumatology.         Dermatomyositis (Multi)    Will see rheumatology.         Encounter for weight management    Patient will check on insurance coverage for GLP-1s.  Nutritionist and weight management support ordered.         Relevant Orders    Referral to Nutrition Services    Referral to [facility]    Mass of axillary tail of right breast - Primary    Possibly a cyst.  Referral given.         Relevant Orders     US breast right cyst aspiration     Other Visit Diagnoses         Healthcare maintenance        Relevant Orders    Referral to Gynecology      Immunosuppression          Dermatopolymyositis, unspecified, organ involvement unspecified (Multi)          Other dermatomyositis, organ involvement unspecified (Multi)                Continue current medications as listed  Follow up in 6 months or sooner. Referrals ordered for close follow up of her concerns for today.            [1]   Current Outpatient Medications:     ALPRAZolam (Xanax) 0.5 mg tablet, One half tablet as needed for anxiety at start of travel.  May repeat after one hour as needed., Disp: 16 tablet, Rfl: 0    ascorbic acid, vitamin C, 500 mg capsule, Take 1 capsule by mouth once daily., Disp: , Rfl:     atenolol (Tenormin) 25 mg tablet, Take 1 tablet (25 mg) by mouth once daily., Disp: 90 tablet, Rfl: 3    cholecalciferol (Vitamin D-3) 25 MCG (1000 UT) tablet, Take 5 tablets (5,000 Units) by mouth once daily., Disp: , Rfl:     cyanocobalamin (Vitamin B-12) 100 mcg tablet, Take 1 tablet (100 mcg) by mouth  once daily., Disp: , Rfl:     fluticasone (Flonase) 50 mcg/actuation nasal spray, Administer 1 spray into affected nostril(s) 2 times a day. Pt uses PRN, Disp: , Rfl:     hydrOXYzine HCL (Atarax) 10 mg tablet, Take one to two tablets as needed for anxiety., Disp: 30 tablet, Rfl: 3    levothyroxine (Synthroid, Levoxyl) 75 mcg tablet, Take 1 tablet (75 mcg) by mouth once daily., Disp: 90 tablet, Rfl: 3    olmesartan (BENIcar) 20 mg tablet, Take 1 tablet (20 mg) by mouth once daily., Disp: 90 tablet, Rfl: 3    ondansetron (Zofran) 4 mg tablet, Take 1 tablet (4 mg) by mouth every 8 hours if needed for nausea or vomiting., Disp: 21 tablet, Rfl: 1    rosuvastatin (Crestor) 20 mg tablet, Take one by mouth once a day AT BEDTIME, Disp: 90 tablet, Rfl: 3    cloNIDine (Catapres) 0.1 mg tablet, Take 1 tablet (0.1 mg) by mouth every 6 hours if needed for high blood pressure (Systolic blood pressure > 160 mm Hg)., Disp: 60 tablet, Rfl: 1

## 2025-07-23 LAB
NON-UH HIE HEPATITIS B SURFACE ANTIBODY QUANT: 4.3
NON-UH HIE HEPATITIS B SURFACE ANTIBODY: NORMAL
NON-UH HIE RUBELLA SEROLOGY: NORMAL

## 2025-07-24 LAB
NON-UH HIE MUMPS ANTIBODY: NORMAL
NON-UH HIE RUBEOLA SCREEN: NORMAL
NON-UH HIE V. ZOSTER ANTIBODY: NORMAL

## 2025-07-25 LAB
NON-UH HIE QUANTIFERON MITOGEN MINUS NIL: 9.98 IU/ML
NON-UH HIE QUANTIFERON NIL: 0.02 IU/ML
NON-UH HIE QUANTIFERON PLUS TB1 MINUS NIL: 0.06 IU/ML
NON-UH HIE QUANTIFERON PLUS TB2 MINUS NIL: 0.08 IU/ML
NON-UH HIE QUANTIFERON TB GOLD PLUS: NEGATIVE

## 2025-08-28 DIAGNOSIS — E78.2 MIXED HYPERLIPIDEMIA: ICD-10-CM

## 2025-08-28 DIAGNOSIS — E03.9 ACQUIRED HYPOTHYROIDISM: ICD-10-CM

## 2025-08-28 DIAGNOSIS — K21.9 CHRONIC GERD: ICD-10-CM

## 2025-09-04 ENCOUNTER — PATIENT OUTREACH (OUTPATIENT)
Dept: CARE COORDINATION | Facility: CLINIC | Age: 60
End: 2025-09-04
Payer: COMMERCIAL

## 2025-09-04 RX ORDER — OMEPRAZOLE 40 MG/1
40 CAPSULE, DELAYED RELEASE ORAL
Qty: 30 CAPSULE | Refills: 11 | Status: SHIPPED | OUTPATIENT
Start: 2025-09-04 | End: 2025-10-04

## 2025-09-04 RX ORDER — ROSUVASTATIN CALCIUM 20 MG/1
TABLET, COATED ORAL
Qty: 90 TABLET | Refills: 3 | Status: SHIPPED | OUTPATIENT
Start: 2025-09-04

## 2025-09-04 RX ORDER — LEVOTHYROXINE SODIUM 75 UG/1
75 TABLET ORAL DAILY
Qty: 90 TABLET | Refills: 3 | Status: SHIPPED | OUTPATIENT
Start: 2025-09-04

## 2025-11-11 ENCOUNTER — APPOINTMENT (OUTPATIENT)
Dept: CARDIOLOGY | Facility: CLINIC | Age: 60
End: 2025-11-11
Payer: COMMERCIAL